# Patient Record
Sex: FEMALE | Employment: FULL TIME
[De-identification: names, ages, dates, MRNs, and addresses within clinical notes are randomized per-mention and may not be internally consistent; named-entity substitution may affect disease eponyms.]

---

## 2017-08-12 ENCOUNTER — HEALTH MAINTENANCE LETTER (OUTPATIENT)
Age: 22
End: 2017-08-12

## 2018-05-10 ENCOUNTER — AMBULATORY - HEALTHEAST (OUTPATIENT)
Dept: MULTI SPECIALTY CLINIC | Facility: CLINIC | Age: 23
End: 2018-05-10

## 2018-05-10 LAB — PAP SMEAR - HIM PATIENT REPORTED: NORMAL

## 2019-03-04 ENCOUNTER — OFFICE VISIT - HEALTHEAST (OUTPATIENT)
Dept: FAMILY MEDICINE | Facility: CLINIC | Age: 24
End: 2019-03-04

## 2019-03-04 ENCOUNTER — COMMUNICATION - HEALTHEAST (OUTPATIENT)
Dept: TELEHEALTH | Facility: CLINIC | Age: 24
End: 2019-03-04

## 2019-03-04 DIAGNOSIS — Z34.92 NORMAL PREGNANCY IN SECOND TRIMESTER: ICD-10-CM

## 2019-03-04 DIAGNOSIS — F33.1 MODERATE EPISODE OF RECURRENT MAJOR DEPRESSIVE DISORDER (H): ICD-10-CM

## 2019-03-04 DIAGNOSIS — F98.8 ATTENTION DEFICIT DISORDER, UNSPECIFIED HYPERACTIVITY PRESENCE: ICD-10-CM

## 2019-03-04 DIAGNOSIS — O23.02 PYELONEPHRITIS AFFECTING PREGNANCY IN SECOND TRIMESTER: ICD-10-CM

## 2019-03-04 DIAGNOSIS — O99.332 TOBACCO SMOKING AFFECTING PREGNANCY IN SECOND TRIMESTER: ICD-10-CM

## 2019-03-04 DIAGNOSIS — F15.11 METHAMPHETAMINE ABUSE IN REMISSION (H): ICD-10-CM

## 2019-03-04 ASSESSMENT — MIFFLIN-ST. JEOR: SCORE: 1675.75

## 2019-03-08 ENCOUNTER — RECORDS - HEALTHEAST (OUTPATIENT)
Dept: ADMINISTRATIVE | Facility: OTHER | Age: 24
End: 2019-03-08

## 2019-03-08 ENCOUNTER — PRENATAL OFFICE VISIT - HEALTHEAST (OUTPATIENT)
Dept: FAMILY MEDICINE | Facility: CLINIC | Age: 24
End: 2019-03-08

## 2019-03-08 DIAGNOSIS — F15.11 METHAMPHETAMINE ABUSE IN REMISSION (H): ICD-10-CM

## 2019-03-08 DIAGNOSIS — O23.02 PYELONEPHRITIS AFFECTING PREGNANCY IN SECOND TRIMESTER: ICD-10-CM

## 2019-03-08 DIAGNOSIS — O09.93 SUPERVISION OF HIGH RISK PREGNANCY IN THIRD TRIMESTER: ICD-10-CM

## 2019-03-08 DIAGNOSIS — F33.1 MODERATE EPISODE OF RECURRENT MAJOR DEPRESSIVE DISORDER (H): ICD-10-CM

## 2019-03-08 DIAGNOSIS — O99.333 TOBACCO SMOKING AFFECTING PREGNANCY IN THIRD TRIMESTER: ICD-10-CM

## 2019-03-08 DIAGNOSIS — Z76.89 ENCOUNTER TO ESTABLISH CARE WITH NEW DOCTOR: ICD-10-CM

## 2019-03-08 DIAGNOSIS — D69.6 THROMBOCYTOPENIA DURING PREGNANCY (H): ICD-10-CM

## 2019-03-08 DIAGNOSIS — O99.119 THROMBOCYTOPENIA DURING PREGNANCY (H): ICD-10-CM

## 2019-03-22 ENCOUNTER — PRENATAL OFFICE VISIT - HEALTHEAST (OUTPATIENT)
Dept: FAMILY MEDICINE | Facility: CLINIC | Age: 24
End: 2019-03-22

## 2019-03-22 DIAGNOSIS — O23.02 PYELONEPHRITIS AFFECTING PREGNANCY IN SECOND TRIMESTER: ICD-10-CM

## 2019-03-22 DIAGNOSIS — O99.119 THROMBOCYTOPENIA DURING PREGNANCY (H): ICD-10-CM

## 2019-03-22 DIAGNOSIS — F15.11 METHAMPHETAMINE ABUSE IN REMISSION (H): ICD-10-CM

## 2019-03-22 DIAGNOSIS — D69.6 THROMBOCYTOPENIA DURING PREGNANCY (H): ICD-10-CM

## 2019-03-22 DIAGNOSIS — F33.1 MODERATE EPISODE OF RECURRENT MAJOR DEPRESSIVE DISORDER (H): ICD-10-CM

## 2019-03-22 DIAGNOSIS — O09.93 SUPERVISION OF HIGH RISK PREGNANCY IN THIRD TRIMESTER: ICD-10-CM

## 2019-03-22 DIAGNOSIS — O99.333 TOBACCO SMOKING AFFECTING PREGNANCY IN THIRD TRIMESTER: ICD-10-CM

## 2019-03-22 LAB
ERYTHROCYTE [DISTWIDTH] IN BLOOD BY AUTOMATED COUNT: 12 % (ref 11–14.5)
FASTING STATUS PATIENT QL REPORTED: NO
GLUCOSE 1H P 50 G GLC PO SERPL-MCNC: 135 MG/DL (ref 70–139)
HCT VFR BLD AUTO: 40 % (ref 35–47)
HGB BLD-MCNC: 13 G/DL (ref 12–16)
MCH RBC QN AUTO: 31.4 PG (ref 27–34)
MCHC RBC AUTO-ENTMCNC: 32.6 G/DL (ref 32–36)
MCV RBC AUTO: 96 FL (ref 80–100)
PLATELET # BLD AUTO: 84 THOU/UL (ref 140–440)
PMV BLD AUTO: 10.4 FL (ref 7–10)
RBC # BLD AUTO: 4.16 MILL/UL (ref 3.8–5.4)
WBC: 13.4 THOU/UL (ref 4–11)

## 2019-03-23 LAB — T PALLIDUM AB SER QL: ABNORMAL

## 2019-03-25 ENCOUNTER — AMBULATORY - HEALTHEAST (OUTPATIENT)
Dept: FAMILY MEDICINE | Facility: CLINIC | Age: 24
End: 2019-03-25

## 2019-03-25 DIAGNOSIS — O23.02 PYELONEPHRITIS AFFECTING PREGNANCY IN SECOND TRIMESTER: ICD-10-CM

## 2019-03-25 DIAGNOSIS — O99.119 THROMBOCYTOPENIA DURING PREGNANCY (H): Primary | ICD-10-CM

## 2019-03-25 DIAGNOSIS — O09.93 SUPERVISION OF HIGH RISK PREGNANCY IN THIRD TRIMESTER: ICD-10-CM

## 2019-03-25 DIAGNOSIS — O99.333 TOBACCO SMOKING AFFECTING PREGNANCY IN THIRD TRIMESTER: ICD-10-CM

## 2019-03-25 DIAGNOSIS — D69.6 THROMBOCYTOPENIA DURING PREGNANCY (H): ICD-10-CM

## 2019-03-25 DIAGNOSIS — O99.119 THROMBOCYTOPENIA DURING PREGNANCY (H): ICD-10-CM

## 2019-03-25 DIAGNOSIS — F15.11 METHAMPHETAMINE ABUSE IN REMISSION (H): ICD-10-CM

## 2019-03-25 DIAGNOSIS — F33.1 MODERATE EPISODE OF RECURRENT MAJOR DEPRESSIVE DISORDER (H): ICD-10-CM

## 2019-03-25 DIAGNOSIS — D69.6 THROMBOCYTOPENIA DURING PREGNANCY (H): Primary | ICD-10-CM

## 2019-03-25 LAB — RPR (REFLEX ORDER ONLY): NORMAL

## 2019-03-27 LAB — T PALLIDUM AB SER QL AGGL: REACTIVE

## 2019-03-28 ENCOUNTER — COMMUNICATION - HEALTHEAST (OUTPATIENT)
Dept: FAMILY MEDICINE | Facility: CLINIC | Age: 24
End: 2019-03-28

## 2019-03-28 ENCOUNTER — AMBULATORY - HEALTHEAST (OUTPATIENT)
Dept: NURSING | Facility: CLINIC | Age: 24
End: 2019-03-28

## 2019-03-28 ENCOUNTER — AMBULATORY - HEALTHEAST (OUTPATIENT)
Dept: FAMILY MEDICINE | Facility: CLINIC | Age: 24
End: 2019-03-28

## 2019-03-28 DIAGNOSIS — O98.113 SYPHILIS AFFECTING PREGNANCY IN THIRD TRIMESTER: ICD-10-CM

## 2019-04-05 ENCOUNTER — AMBULATORY - HEALTHEAST (OUTPATIENT)
Dept: MATERNAL FETAL MEDICINE | Facility: HOSPITAL | Age: 24
End: 2019-04-05

## 2019-04-05 ENCOUNTER — COMMUNICATION - HEALTHEAST (OUTPATIENT)
Dept: SCHEDULING | Facility: CLINIC | Age: 24
End: 2019-04-05

## 2019-04-05 DIAGNOSIS — O26.90 PREGNANCY, ANTEPARTUM, COMPLICATIONS: ICD-10-CM

## 2019-04-08 ENCOUNTER — RECORDS - HEALTHEAST (OUTPATIENT)
Dept: ADMINISTRATIVE | Facility: OTHER | Age: 24
End: 2019-04-08

## 2019-04-08 ENCOUNTER — OFFICE VISIT - HEALTHEAST (OUTPATIENT)
Dept: MATERNAL FETAL MEDICINE | Facility: HOSPITAL | Age: 24
End: 2019-04-08

## 2019-04-08 ENCOUNTER — RECORDS - HEALTHEAST (OUTPATIENT)
Dept: ULTRASOUND IMAGING | Facility: HOSPITAL | Age: 24
End: 2019-04-08

## 2019-04-08 DIAGNOSIS — O26.90 PREGNANCY RELATED CONDITIONS, UNSPECIFIED, UNSPECIFIED TRIMESTER: ICD-10-CM

## 2019-04-08 DIAGNOSIS — O26.90 PREGNANCY, ANTEPARTUM, COMPLICATIONS: ICD-10-CM

## 2019-04-08 DIAGNOSIS — O99.119 THROMBOCYTOPENIA AFFECTING PREGNANCY (H): ICD-10-CM

## 2019-04-08 DIAGNOSIS — D69.6 THROMBOCYTOPENIA AFFECTING PREGNANCY (H): ICD-10-CM

## 2019-04-08 LAB
ALBUMIN SERPL-MCNC: 3.2 G/DL (ref 3.5–5)
ALP SERPL-CCNC: 143 U/L (ref 45–120)
ALT SERPL W P-5'-P-CCNC: 17 U/L (ref 0–45)
ANION GAP SERPL CALCULATED.3IONS-SCNC: 7 MMOL/L (ref 5–18)
APTT PPP: 28 SECONDS (ref 24–37)
AST SERPL W P-5'-P-CCNC: 20 U/L (ref 0–40)
BILIRUB SERPL-MCNC: 0.3 MG/DL (ref 0–1)
BUN SERPL-MCNC: 5 MG/DL (ref 8–22)
CALCIUM SERPL-MCNC: 9.2 MG/DL (ref 8.5–10.5)
CHLORIDE BLD-SCNC: 106 MMOL/L (ref 98–107)
CO2 SERPL-SCNC: 22 MMOL/L (ref 22–31)
CREAT SERPL-MCNC: 0.61 MG/DL (ref 0.6–1.1)
ERYTHROCYTE [DISTWIDTH] IN BLOOD BY AUTOMATED COUNT: 13.2 % (ref 11–14.5)
FIBRINOGEN PPP-MCNC: 397 MG/DL (ref 170–410)
GFR SERPL CREATININE-BSD FRML MDRD: >60 ML/MIN/1.73M2
GLUCOSE BLD-MCNC: 91 MG/DL (ref 70–125)
HCT VFR BLD AUTO: 38.7 % (ref 35–47)
HGB BLD-MCNC: 13.3 G/DL (ref 12–16)
INR PPP: 1 (ref 0.9–1.1)
MCH RBC QN AUTO: 32.1 PG (ref 27–34)
MCHC RBC AUTO-ENTMCNC: 34.4 G/DL (ref 32–36)
MCV RBC AUTO: 94 FL (ref 80–100)
PLATELET # BLD AUTO: 75 THOU/UL (ref 140–440)
PMV BLD AUTO: 14.3 FL (ref 8.5–12.5)
POTASSIUM BLD-SCNC: 4.2 MMOL/L (ref 3.5–5)
PROT SERPL-MCNC: 6.6 G/DL (ref 6–8)
RBC # BLD AUTO: 4.14 MILL/UL (ref 3.8–5.4)
SODIUM SERPL-SCNC: 135 MMOL/L (ref 136–145)
WBC: 13.9 THOU/UL (ref 4–11)

## 2019-04-16 ENCOUNTER — RECORDS - HEALTHEAST (OUTPATIENT)
Dept: ADMINISTRATIVE | Facility: OTHER | Age: 24
End: 2019-04-16

## 2019-04-25 ENCOUNTER — OFFICE VISIT - HEALTHEAST (OUTPATIENT)
Dept: FAMILY MEDICINE | Facility: CLINIC | Age: 24
End: 2019-04-25

## 2019-04-25 ENCOUNTER — COMMUNICATION - HEALTHEAST (OUTPATIENT)
Dept: NURSING | Facility: CLINIC | Age: 24
End: 2019-04-25

## 2019-04-25 DIAGNOSIS — D69.6 THROMBOCYTOPENIA DURING PREGNANCY (H): ICD-10-CM

## 2019-04-25 DIAGNOSIS — O99.333 TOBACCO SMOKING AFFECTING PREGNANCY IN THIRD TRIMESTER: ICD-10-CM

## 2019-04-25 DIAGNOSIS — F33.1 MODERATE EPISODE OF RECURRENT MAJOR DEPRESSIVE DISORDER (H): ICD-10-CM

## 2019-04-25 DIAGNOSIS — F15.11 METHAMPHETAMINE ABUSE IN REMISSION (H): ICD-10-CM

## 2019-04-25 DIAGNOSIS — O99.119 THROMBOCYTOPENIA DURING PREGNANCY (H): ICD-10-CM

## 2019-04-25 DIAGNOSIS — O98.113 SYPHILIS AFFECTING PREGNANCY IN THIRD TRIMESTER: ICD-10-CM

## 2019-04-25 DIAGNOSIS — O23.02 PYELONEPHRITIS AFFECTING PREGNANCY IN SECOND TRIMESTER: ICD-10-CM

## 2019-04-25 DIAGNOSIS — O09.93 SUPERVISION OF HIGH RISK PREGNANCY IN THIRD TRIMESTER: ICD-10-CM

## 2019-04-25 LAB
ERYTHROCYTE [DISTWIDTH] IN BLOOD BY AUTOMATED COUNT: 12 % (ref 11–14.5)
HCT VFR BLD AUTO: 40.3 % (ref 35–47)
HGB BLD-MCNC: 13.3 G/DL (ref 12–16)
MCH RBC QN AUTO: 32 PG (ref 27–34)
MCHC RBC AUTO-ENTMCNC: 33 G/DL (ref 32–36)
MCV RBC AUTO: 97 FL (ref 80–100)
PLATELET # BLD AUTO: 82 THOU/UL (ref 140–440)
PMV BLD AUTO: 10.8 FL (ref 7–10)
RBC # BLD AUTO: 4.16 MILL/UL (ref 3.8–5.4)
WBC: 18.2 THOU/UL (ref 4–11)

## 2019-04-30 ENCOUNTER — AMBULATORY - HEALTHEAST (OUTPATIENT)
Dept: NURSING | Facility: CLINIC | Age: 24
End: 2019-04-30

## 2019-04-30 ENCOUNTER — AMBULATORY - HEALTHEAST (OUTPATIENT)
Dept: LAB | Facility: CLINIC | Age: 24
End: 2019-04-30

## 2019-04-30 DIAGNOSIS — O99.119 THROMBOCYTOPENIA DURING PREGNANCY (H): ICD-10-CM

## 2019-04-30 DIAGNOSIS — D69.6 THROMBOCYTOPENIA DURING PREGNANCY (H): ICD-10-CM

## 2019-04-30 LAB
ERYTHROCYTE [DISTWIDTH] IN BLOOD BY AUTOMATED COUNT: 13.1 % (ref 11–14.5)
HCT VFR BLD AUTO: 39.1 % (ref 35–47)
HGB BLD-MCNC: 12.8 G/DL (ref 12–16)
MCH RBC QN AUTO: 31.7 PG (ref 27–34)
MCHC RBC AUTO-ENTMCNC: 32.7 G/DL (ref 32–36)
MCV RBC AUTO: 97 FL (ref 80–100)
PLATELET # BLD AUTO: 84 THOU/UL (ref 140–440)
RBC # BLD AUTO: 4.04 MILL/UL (ref 3.8–5.4)
WBC: 16.5 THOU/UL (ref 4–11)

## 2019-05-02 ENCOUNTER — AMBULATORY - HEALTHEAST (OUTPATIENT)
Dept: NURSING | Facility: CLINIC | Age: 24
End: 2019-05-02

## 2019-05-03 ENCOUNTER — COMMUNICATION - HEALTHEAST (OUTPATIENT)
Dept: NURSING | Facility: CLINIC | Age: 24
End: 2019-05-03

## 2019-05-09 ENCOUNTER — PRENATAL OFFICE VISIT - HEALTHEAST (OUTPATIENT)
Dept: FAMILY MEDICINE | Facility: CLINIC | Age: 24
End: 2019-05-09

## 2019-05-09 ENCOUNTER — AMBULATORY - HEALTHEAST (OUTPATIENT)
Dept: NURSING | Facility: CLINIC | Age: 24
End: 2019-05-09

## 2019-05-09 DIAGNOSIS — O98.113 SYPHILIS AFFECTING PREGNANCY IN THIRD TRIMESTER: ICD-10-CM

## 2019-05-09 DIAGNOSIS — F15.11 METHAMPHETAMINE ABUSE IN REMISSION (H): ICD-10-CM

## 2019-05-09 DIAGNOSIS — F33.1 MODERATE EPISODE OF RECURRENT MAJOR DEPRESSIVE DISORDER (H): ICD-10-CM

## 2019-05-09 DIAGNOSIS — O23.02 PYELONEPHRITIS AFFECTING PREGNANCY IN SECOND TRIMESTER: ICD-10-CM

## 2019-05-09 DIAGNOSIS — N89.8 VAGINAL DISCHARGE: ICD-10-CM

## 2019-05-09 DIAGNOSIS — D69.6 THROMBOCYTOPENIA DURING PREGNANCY (H): ICD-10-CM

## 2019-05-09 DIAGNOSIS — O09.93 SUPERVISION OF HIGH RISK PREGNANCY IN THIRD TRIMESTER: ICD-10-CM

## 2019-05-09 DIAGNOSIS — O99.333 TOBACCO SMOKING AFFECTING PREGNANCY IN THIRD TRIMESTER: ICD-10-CM

## 2019-05-09 DIAGNOSIS — O99.119 THROMBOCYTOPENIA DURING PREGNANCY (H): ICD-10-CM

## 2019-05-09 LAB
CLUE CELLS: NORMAL
ERYTHROCYTE [DISTWIDTH] IN BLOOD BY AUTOMATED COUNT: 12.1 % (ref 11–14.5)
HCT VFR BLD AUTO: 39.2 % (ref 35–47)
HGB BLD-MCNC: 12.9 G/DL (ref 12–16)
MCH RBC QN AUTO: 32.2 PG (ref 27–34)
MCHC RBC AUTO-ENTMCNC: 32.9 G/DL (ref 32–36)
MCV RBC AUTO: 98 FL (ref 80–100)
PLATELET # BLD AUTO: 59 THOU/UL (ref 140–440)
PMV BLD AUTO: 10.8 FL (ref 7–10)
RBC # BLD AUTO: 4 MILL/UL (ref 3.8–5.4)
TRICHOMONAS, WET PREP: NORMAL
WBC: 13.6 THOU/UL (ref 4–11)
YEAST, WET PREP: NORMAL

## 2019-05-10 ENCOUNTER — COMMUNICATION - HEALTHEAST (OUTPATIENT)
Dept: NURSING | Facility: CLINIC | Age: 24
End: 2019-05-10

## 2019-05-10 LAB
ALLERGIC TO PENICILLIN: NO
GP B STREP DNA SPEC QL NAA+PROBE: NEGATIVE

## 2019-05-17 ENCOUNTER — PRENATAL OFFICE VISIT - HEALTHEAST (OUTPATIENT)
Dept: FAMILY MEDICINE | Facility: CLINIC | Age: 24
End: 2019-05-17

## 2019-05-17 ENCOUNTER — COMMUNICATION - HEALTHEAST (OUTPATIENT)
Dept: NURSING | Facility: CLINIC | Age: 24
End: 2019-05-17

## 2019-05-17 DIAGNOSIS — O23.02 PYELONEPHRITIS AFFECTING PREGNANCY IN SECOND TRIMESTER: ICD-10-CM

## 2019-05-17 DIAGNOSIS — O09.93 SUPERVISION OF HIGH RISK PREGNANCY IN THIRD TRIMESTER: ICD-10-CM

## 2019-05-17 DIAGNOSIS — F15.11 METHAMPHETAMINE ABUSE IN REMISSION (H): ICD-10-CM

## 2019-05-17 DIAGNOSIS — K21.9 GASTROESOPHAGEAL REFLUX IN PREGNANCY IN THIRD TRIMESTER: ICD-10-CM

## 2019-05-17 DIAGNOSIS — D69.6 THROMBOCYTOPENIA DURING PREGNANCY (H): ICD-10-CM

## 2019-05-17 DIAGNOSIS — O99.119 THROMBOCYTOPENIA DURING PREGNANCY (H): ICD-10-CM

## 2019-05-17 DIAGNOSIS — F33.1 MODERATE EPISODE OF RECURRENT MAJOR DEPRESSIVE DISORDER (H): ICD-10-CM

## 2019-05-17 DIAGNOSIS — O99.613 GASTROESOPHAGEAL REFLUX IN PREGNANCY IN THIRD TRIMESTER: ICD-10-CM

## 2019-05-17 DIAGNOSIS — O99.333 TOBACCO SMOKING AFFECTING PREGNANCY IN THIRD TRIMESTER: ICD-10-CM

## 2019-05-17 DIAGNOSIS — O98.113 SYPHILIS AFFECTING PREGNANCY IN THIRD TRIMESTER: ICD-10-CM

## 2019-05-17 LAB
ERYTHROCYTE [DISTWIDTH] IN BLOOD BY AUTOMATED COUNT: 12.5 % (ref 11–14.5)
HCT VFR BLD AUTO: 38.7 % (ref 35–47)
HGB BLD-MCNC: 12.7 G/DL (ref 12–16)
MCH RBC QN AUTO: 32.2 PG (ref 27–34)
MCHC RBC AUTO-ENTMCNC: 32.8 G/DL (ref 32–36)
MCV RBC AUTO: 98 FL (ref 80–100)
PLATELET # BLD AUTO: 72 THOU/UL (ref 140–440)
PMV BLD AUTO: 10.8 FL (ref 7–10)
RBC # BLD AUTO: 3.94 MILL/UL (ref 3.8–5.4)
WBC: 13.6 THOU/UL (ref 4–11)

## 2019-05-21 ENCOUNTER — COMMUNICATION - HEALTHEAST (OUTPATIENT)
Dept: NURSING | Facility: CLINIC | Age: 24
End: 2019-05-21

## 2019-05-22 ENCOUNTER — PRENATAL OFFICE VISIT - HEALTHEAST (OUTPATIENT)
Dept: FAMILY MEDICINE | Facility: CLINIC | Age: 24
End: 2019-05-22

## 2019-05-22 ENCOUNTER — COMMUNICATION - HEALTHEAST (OUTPATIENT)
Dept: CARE COORDINATION | Facility: CLINIC | Age: 24
End: 2019-05-22

## 2019-05-22 DIAGNOSIS — O23.02 PYELONEPHRITIS AFFECTING PREGNANCY IN SECOND TRIMESTER: ICD-10-CM

## 2019-05-22 DIAGNOSIS — D69.6 THROMBOCYTOPENIA DURING PREGNANCY (H): ICD-10-CM

## 2019-05-22 DIAGNOSIS — O09.93 SUPERVISION OF HIGH RISK PREGNANCY IN THIRD TRIMESTER: ICD-10-CM

## 2019-05-22 DIAGNOSIS — O99.119 THROMBOCYTOPENIA DURING PREGNANCY (H): ICD-10-CM

## 2019-05-22 DIAGNOSIS — O99.333 TOBACCO SMOKING AFFECTING PREGNANCY IN THIRD TRIMESTER: ICD-10-CM

## 2019-05-22 DIAGNOSIS — F33.1 MODERATE EPISODE OF RECURRENT MAJOR DEPRESSIVE DISORDER (H): ICD-10-CM

## 2019-05-22 DIAGNOSIS — F15.11 METHAMPHETAMINE ABUSE IN REMISSION (H): ICD-10-CM

## 2019-05-22 DIAGNOSIS — O98.113 SYPHILIS AFFECTING PREGNANCY IN THIRD TRIMESTER: ICD-10-CM

## 2019-05-22 LAB
ERYTHROCYTE [DISTWIDTH] IN BLOOD BY AUTOMATED COUNT: 13.3 % (ref 11–14.5)
HCT VFR BLD AUTO: 39.7 % (ref 35–47)
HGB BLD-MCNC: 13.2 G/DL (ref 12–16)
MCH RBC QN AUTO: 32.3 PG (ref 27–34)
MCHC RBC AUTO-ENTMCNC: 33.2 G/DL (ref 32–36)
MCV RBC AUTO: 97 FL (ref 80–100)
PLATELET # BLD AUTO: 74 THOU/UL (ref 140–440)
RBC # BLD AUTO: 4.09 MILL/UL (ref 3.8–5.4)
WBC: 14.4 THOU/UL (ref 4–11)

## 2019-05-23 ENCOUNTER — AMBULATORY - HEALTHEAST (OUTPATIENT)
Dept: FAMILY MEDICINE | Facility: CLINIC | Age: 24
End: 2019-05-23

## 2019-05-23 DIAGNOSIS — D69.6 THROMBOCYTOPENIA DURING PREGNANCY (H): ICD-10-CM

## 2019-05-23 DIAGNOSIS — O99.119 THROMBOCYTOPENIA DURING PREGNANCY (H): ICD-10-CM

## 2019-05-30 ENCOUNTER — PRENATAL OFFICE VISIT - HEALTHEAST (OUTPATIENT)
Dept: FAMILY MEDICINE | Facility: CLINIC | Age: 24
End: 2019-05-30

## 2019-05-30 DIAGNOSIS — O99.119 THROMBOCYTOPENIA DURING PREGNANCY (H): ICD-10-CM

## 2019-05-30 DIAGNOSIS — D69.6 THROMBOCYTOPENIA DURING PREGNANCY (H): ICD-10-CM

## 2019-05-30 DIAGNOSIS — F15.11 METHAMPHETAMINE ABUSE IN REMISSION (H): ICD-10-CM

## 2019-05-30 DIAGNOSIS — F33.1 MODERATE EPISODE OF RECURRENT MAJOR DEPRESSIVE DISORDER (H): ICD-10-CM

## 2019-05-30 DIAGNOSIS — O23.02 PYELONEPHRITIS AFFECTING PREGNANCY IN SECOND TRIMESTER: ICD-10-CM

## 2019-05-30 DIAGNOSIS — O09.93 SUPERVISION OF HIGH RISK PREGNANCY IN THIRD TRIMESTER: ICD-10-CM

## 2019-05-30 DIAGNOSIS — O99.333 TOBACCO SMOKING AFFECTING PREGNANCY IN THIRD TRIMESTER: ICD-10-CM

## 2019-05-30 DIAGNOSIS — O98.113 SYPHILIS AFFECTING PREGNANCY IN THIRD TRIMESTER: ICD-10-CM

## 2019-05-30 LAB
ERYTHROCYTE [DISTWIDTH] IN BLOOD BY AUTOMATED COUNT: 13.8 % (ref 11–14.5)
HCT VFR BLD AUTO: 39.1 % (ref 35–47)
HGB BLD-MCNC: 13.4 G/DL (ref 12–16)
MCH RBC QN AUTO: 32.2 PG (ref 27–34)
MCHC RBC AUTO-ENTMCNC: 34.3 G/DL (ref 32–36)
MCV RBC AUTO: 94 FL (ref 80–100)
PLATELET # BLD AUTO: 134 THOU/UL (ref 140–440)
PMV BLD AUTO: 14.2 FL (ref 8.5–12.5)
RBC # BLD AUTO: 4.16 MILL/UL (ref 3.8–5.4)
WBC: 22.1 THOU/UL (ref 4–11)

## 2019-05-31 ENCOUNTER — COMMUNICATION - HEALTHEAST (OUTPATIENT)
Dept: FAMILY MEDICINE | Facility: CLINIC | Age: 24
End: 2019-05-31

## 2019-06-05 ENCOUNTER — COMMUNICATION - HEALTHEAST (OUTPATIENT)
Dept: NURSING | Facility: CLINIC | Age: 24
End: 2019-06-05

## 2019-06-05 ENCOUNTER — COMMUNICATION - HEALTHEAST (OUTPATIENT)
Dept: FAMILY MEDICINE | Facility: CLINIC | Age: 24
End: 2019-06-05

## 2019-06-07 ENCOUNTER — COMMUNICATION - HEALTHEAST (OUTPATIENT)
Dept: CARE COORDINATION | Facility: CLINIC | Age: 24
End: 2019-06-07

## 2019-06-12 ENCOUNTER — OFFICE VISIT - HEALTHEAST (OUTPATIENT)
Dept: FAMILY MEDICINE | Facility: CLINIC | Age: 24
End: 2019-06-12

## 2019-06-12 DIAGNOSIS — F33.1 MODERATE EPISODE OF RECURRENT MAJOR DEPRESSIVE DISORDER (H): ICD-10-CM

## 2019-06-12 DIAGNOSIS — F98.8 ATTENTION DEFICIT DISORDER, UNSPECIFIED HYPERACTIVITY PRESENCE: ICD-10-CM

## 2019-06-12 DIAGNOSIS — O99.119 THROMBOCYTOPENIA DURING PREGNANCY (H): ICD-10-CM

## 2019-06-12 DIAGNOSIS — D69.6 THROMBOCYTOPENIA DURING PREGNANCY (H): ICD-10-CM

## 2019-06-12 ASSESSMENT — MIFFLIN-ST. JEOR: SCORE: 1712.04

## 2019-06-14 ENCOUNTER — COMMUNICATION - HEALTHEAST (OUTPATIENT)
Dept: NURSING | Facility: CLINIC | Age: 24
End: 2019-06-14

## 2019-06-19 ENCOUNTER — COMMUNICATION - HEALTHEAST (OUTPATIENT)
Dept: NURSING | Facility: CLINIC | Age: 24
End: 2019-06-19

## 2019-06-19 ENCOUNTER — COMMUNICATION - HEALTHEAST (OUTPATIENT)
Dept: CARE COORDINATION | Facility: CLINIC | Age: 24
End: 2019-06-19

## 2019-07-01 ENCOUNTER — COMMUNICATION - HEALTHEAST (OUTPATIENT)
Dept: CARE COORDINATION | Facility: CLINIC | Age: 24
End: 2019-07-01

## 2019-07-12 ENCOUNTER — COMMUNICATION - HEALTHEAST (OUTPATIENT)
Dept: NURSING | Facility: CLINIC | Age: 24
End: 2019-07-12

## 2019-07-15 ENCOUNTER — COMMUNICATION - HEALTHEAST (OUTPATIENT)
Dept: NURSING | Facility: CLINIC | Age: 24
End: 2019-07-15

## 2019-07-30 ENCOUNTER — COMMUNICATION - HEALTHEAST (OUTPATIENT)
Dept: NURSING | Facility: CLINIC | Age: 24
End: 2019-07-30

## 2019-08-29 ENCOUNTER — COMMUNICATION - HEALTHEAST (OUTPATIENT)
Dept: NURSING | Facility: CLINIC | Age: 24
End: 2019-08-29

## 2019-09-06 ENCOUNTER — COMMUNICATION - HEALTHEAST (OUTPATIENT)
Dept: NURSING | Facility: CLINIC | Age: 24
End: 2019-09-06

## 2019-09-17 ENCOUNTER — COMMUNICATION - HEALTHEAST (OUTPATIENT)
Dept: NURSING | Facility: CLINIC | Age: 24
End: 2019-09-17

## 2019-11-06 ENCOUNTER — HEALTH MAINTENANCE LETTER (OUTPATIENT)
Age: 24
End: 2019-11-06

## 2020-11-29 ENCOUNTER — HEALTH MAINTENANCE LETTER (OUTPATIENT)
Age: 25
End: 2020-11-29

## 2021-05-25 ENCOUNTER — RECORDS - HEALTHEAST (OUTPATIENT)
Dept: ADMINISTRATIVE | Facility: CLINIC | Age: 26
End: 2021-05-25

## 2021-05-26 NOTE — PATIENT INSTRUCTIONS - HE
Phone Numbers:  [facility]: [phone]     When to call or come in to the hospital  If you notice a decrease in your baby's movement.   If your begin to experience contractions that are 5 minutes or less apart and lasting for over 45 seconds, or if contractions are becoming more painful.  If you have any bleeding or leakage of fluids.   If you have a headache not resolved with tylenol, right upper abdominal pain, or sudden onset of swelling.  You know your body best. Never hesitate to call or go to the hospital if something doesn't feel right!    Blood Glucose Screening During Pregnancy   Gestational diabetes is diabetes that only pregnant women get. Changes in your body during pregnancy can cause high blood sugar (glucose). This can cause problems for you and your baby. It is a serious condition, but it can be controlled.  What to Know If You Test Positive   Gestational diabetes is treatable. The best way to control gestational diabetes is to find out you have it as early as possible and start treatment quickly.   Gestational diabetes can cause problems for the mother during pregnancy. It can also cause problems with the baby during pregnancy, delivery, and after. Treatment greatly lowers the chance for problems.   The changes in your body that cause gestational diabetes normally occur only when you are pregnant. After the baby is born, your body goes back to normal and the condition goes away. You may be more likely to have type 2 diabetes later, though. So talk to your doctor about ways to help prevent type 2 diabetes.  Treating Gestational Diabetes   You ll need to check your blood sugar regularly. You can do this at home by pricking your finger and checking a drop of blood on a glucose monitor. Your health care provider will show you how and when to check your blood sugar and discuss your target blood sugar level.   To manage your blood sugar, you will be given a special plan. It will likely involve  planning your meals and getting regular exercise. Some women need to take a hormone called insulin, or an oral hypoglycemic medication to help control their blood sugar.    Making Plans for Feeding Your Baby  By this point, you probably have read a lot about feeding your baby. Breastfeed or formula? Each mother s decision is her own and we respect you and your choices. We ve gathered information on both breastfeeding and formula feeding to help with your decision. Talking with your physician can help in your decision.     Skin-to-skin contact  Being close to mom helps your baby adjust to life outside of the womb. It helps your baby regulate their body temperature, heart rate, and breathing. Your baby will usually be placed skin-to-skin immediately following birth or as soon as possible, if medical intervention is needed.    Rooming-In  Having your baby stay with you in your room is called  rooming-in.  Keeping your baby in your room helps you to learn how to care for your baby by getting to know your baby s cues, body rhythms and sleep cycle.       Cue-based feeding  Cues (signals) are baby s way of telling you what he or she wants. When you learn your infant s cues, you know how to care for and feed your baby. Feeding cues are the licking and smacking of lips, bringing their fist to their mouth, and a reflex called  rooting - where baby turns and opens his or her mouth, searching for the breast or bottle. Crying is a late feeding cue. Babies can feed frequently, often at least 8 times in 24 hours.  Breastfeeding facts  Breast milk is the best source of nutrition for your baby and is available at birth. In the first couple of days, your milk volume is already starting to increase, though it may not be noticeable. Breastfeeding frequently to increase your milk supply. Within three to five days, you will begin to notice larger milk volumes. An increase in breast size, heaviness and firmness are often described as the  milk  coming in.  Frequent breastfeeding can help breasts from getting overly firm and painful. You will know the baby is getting enough milk if your baby is having wet and dirty diapers and gaining weight.   Positioning and attachment   Get comfortable. Use pillows as needed to support your arms and baby. Hold baby close at the level of your breast, facing you in a tummy to tummy position. Skin to skin helps with this. Position the baby with his or her nose by the nipple. There should be a straight line from baby s ear to shoulder to hips.  Tickle your baby s lips or wait for baby to open mouth wide, bring baby to breast by leading with the chin. Aim the nipple at the roof of baby s mouth. A rapid sucking pattern is followed by longer, drawing pattern with occasional swallows heard. When baby is correctly latched, your nipple and much of the areola are pulled well into baby s mouth.      Returning to work or school  Focus on a good start to breastfeeding. Many women continue to provide breastmilk for their baby when they return to work or school. Making plans about where to pump and store milk can make the transition go well. Talk with other mothers who have also returned to work or school for tips and support. Your employer s Human Resource department may be a resource as well.        babies can mean fewer  sick  days for you.    A quality breast pump will also save time and add comfort. Check with your insurance prior to giving birth for breast pump coverage. Many insurance companies include a pump within your benefits.    Breastfeed when you are with your baby. Reserve your bottles of breast milk for when you are away.     Your breasts will need to be  emptied  either by your baby or a pump. Plan to pump at least twice in an eight hour day.    If you cannot pump at work, continue breastfeeding at home. Any amount of breast milk is worth giving to your baby.    Formula feeding facts  If you are planning  to use formula to feed your baby, you will want to make some preparations ahead of time. Talk to your doctor about what type of formula to use. Some are iron-fortified, meaning they have extra iron in them. You will want to purchase formula and bottles before your baby is born to be sure you are ready after you return from the hospital. The hospitals do not provide formula samples to take home.    Be sure to follow formula mixing directions closely. Regular milk in the dairy case at the grocery store should not be given to babies under 1 year old. Baby formula is sold in several forms including:    Ready-to-use. This is the most expensive, but no mixing is necessary.    Concentrated liquid. This is less expensive than ready-to-use and you mix with water.    Powder. This is the least expensive. You mix one level scoop of powdered formula with two ounces of water and stir well.    How do I warm my baby s bottles?  You may feed your baby a bottle without warming it first. It is OK for the breast milk or formula to be cool or room temperature. If your baby seems to prefer it warmed, you can put the filled bottle in a container of warm water and let it stand for a few minutes. Check the temperature of the liquid on your skin before feeding it to your baby; to be sure it isn t too hot. Do not heat bottles in the microwave. Microwaves heat food and liquids unevenly, and this can cause hot spots that can burn your baby.    How do I clean and sterilize bottles?  Sterilize bottles and nipples before you use them for the first time. You can do this by putting them in boiling water for 5 minutes. After that first time, you can wash them in hot and soapy water. Rinse them carefully to be sure there is no soap left on them. You can also wash them in the .

## 2021-05-26 NOTE — PROGRESS NOTES
Clinic Note - Return OB Visit    Mariela Chester is a 23 y.o.  female who presents to clinic for a follow up OB visit. She is currently 28w2d with an estimated date of delivery of 19 via LMP c/w 13 wk US. She denies headache, chest pain, shortness of breath, abdominal pain/contractions, vaginal bleeding, or abnormal discharge. She has felt baby move.     New concerns today:   -no concerns today  -outpatient treatment still going fine, no relapse  -tobacco 1ppd  -lexapro - overall going well but interested in increasing dose for better management; GAD7 score 8 today, PHQ9 score 16 today    OB History    Para Term  AB Living   2 1 1     1   SAB TAB Ectopic Multiple Live Births           1      # Outcome Date GA Lbr Rudy/2nd Weight Sex Delivery Anes PTL Lv   2 Current            1 Term 2015    M Vag-Spont EPI N JUAN          Physical exam:  /60   Wt 204 lb 3 oz (92.6 kg)   LMP 2018   BMI 31.98 kg/m      General: alert, female in no acute distress  Abdomen: gravid uterus appropriate for gestation age at 28 cm above pubic symphysis, non-tender, FHTs 145  Extremities: no edema    Assessment/Plan:  1. Supervision of high risk pregnancy in third trimester   at 28+2 weeks today. Multiple high risk issues affecting this pregnancy including intermittent meth use (during most of first trimester and then again in 2nd trimester), tobacco use, pyelonephritis during pregnancy. Labs as below. Tdap today. Doesn't need Rhogam.  - Glucose,Gestational Challenge (1 Hour)  - RPR  - HM2(CBC w/o Differential)  - Tdap vaccine,  6yo or older,  IM    2. Methamphetamine abuse in remission (H)  Pt is currently participating in intensive outpatient treatment for her meth use - no relapses. Pt states through the program she is having drug testing done twice per week. CPS has already been contacted this pregnancy, will contact them again upon delivery or sooner if new concerns arise. Will plan for  UDS at hospital and possibly intermittently in clinic. Will plan to deliver at [facility] given capabilities for  care and monitoring, mom agrees with this plan.    3. Pyelonephritis affecting pregnancy in second trimester  Pt with a hx of pyelonephritis in Dec 2018, which was treated and pt declines further symptoms. Pt was not started on prophylactic antibiotic therapy at that time and I see no reason to do so now; however, if pt develops one more UTI during this pregnancy I would plan for ppx antibiotics until delivery.    4. Tobacco smoking affecting pregnancy in third trimester  Pt is currently smoking 1 ppd, she states she is unable to quit right now. Again reviewed potential risks and recommendation to quit.    5. Moderate episode of recurrent major depressive disorder (H)  Pt is currently taking lexapro 10mg daily - feels that this is working but symptoms not fully controlled, wondering about increasing dose. PHQ9 and GAD7 scores have been improving since starting medication. Agree with increasing dose to 20mg daily, prescription sent to pharmacy.  - PHQ9 Depression Screen  - escitalopram oxalate (LEXAPRO) 20 MG tablet; Take 1 tablet (20 mg total) by mouth daily.  Dispense: 60 tablet; Refill: 1    6. Thrombocytopenia during pregnancy (H)  Denies issues with easy bruising or bleeding. Per review of labs in [facility] Everywhere here are prior platelet levels:  18 102  12/10/18 67  18 103  18 100  18 83  Recheck today  - HM2(CBC w/o Differential)      Information given on gestational diabetes. 1 hour glucose tolerance test today, in addition to CBC and syphilis.   TDAP immunization given.  Blood type A pos. Rhogam not indicated  Discussed signs and symptoms of  labor.     Follow up in 3 weeks for routine prenatal visit.     Amanda Chery MD

## 2021-05-27 NOTE — TELEPHONE ENCOUNTER
Reason contacted:  Symptom   Information relayed:  Below message, provided patient with the telephone number for maternal fetal medicine as she does not have an appointment scheduled yet.   Additional questions:  No  Further follow-up needed:  No  Okay to leave a detailed message:  No

## 2021-05-27 NOTE — TELEPHONE ENCOUNTER
Pt is scheduled to see me next week and apparently hematology on 4/16. I had put in a referral for MFM for her low platelets but hematology can mange this also.    If pt is significantly concerned she should go to walk in clinic otherwise I am not too terribly worried about a bruise on her breast if she is otherwise feeling well.    Dr Chery

## 2021-05-27 NOTE — TELEPHONE ENCOUNTER
Left message to call back for: test results within this encounter. Please Advice pt to schedule a nurse only apt for penicillin unless  she has more question she can be put on Dr Chery schedule. Per Dr Matthews advice.  Magi Almaraz

## 2021-05-27 NOTE — TELEPHONE ENCOUNTER
----- Message from Amanda Chery MD sent at 3/28/2019  9:10 AM CDT -----  Please call pt to let her know her syphilis was positive and she needs to come to clinic to get treatment.    Hi Mariela,     Your syphilis test did end up coming back positive - I need you to come to clinic as soon as possible to get a shot of penicillin to treat this infection.    Thanks,  Dr Chery

## 2021-05-27 NOTE — PROGRESS NOTES
"Please see \"Imaging\" tab under \"Chart Review\" for details of today's visit and MFM Consult.    Gatito Vasquez        "

## 2021-05-27 NOTE — TELEPHONE ENCOUNTER
"Caller reports that she has developed a bruise on her R breast       Approx 6\"x4\"     Started yesterday    Painful to the touch     No swelling   No drainage   No fever   No injury       Call lost when transferring from from scheduling - attempted to call pt back -  Fred unable to dial that tele# per message     Msg to provider to alert them of this         Porfirio Sotelo, RN   Triage and Medication Refills      Reason for Disposition    Patient wants to be seen    Protocols used: BREAST SYMPTOMS-A-OH      "

## 2021-05-28 NOTE — PROGRESS NOTES
Scheduled F/U Call:  Attempt 1    Attempt 1: Care Guide called patient.  If this patient is returning my call please transfer to Nena Regan at ext [phone].  ** Next [facility] Outreach date: 19  RN Recommendations and Referrals  Financial resource guide consult/follow-up: Please send a referral if not already sent.  [facility] : Phone appointment on 2019 @ 2:00     Action Plan     RN Will  Will not add the patient to [facility] tracking list     Care Guide Will  Will follow Standard Work  Financial resource guide consult/follow-up: Please send a referral if not already sent.  [facility] : Phone appointment on 2019 @ 2:00      Emergency Plan  Depression  Everyone feels down at times. The blues are a natural part of life. But an unhappy period that s intense or lasts for more than a couple of weeks can be a sign of depression. Depression is a serious illness. It can disrupt the lives of family and friends. If you know someone you think may be depressed, find out what you can do to help.     Know the serious signals  Warning signals for suicide include:    Threats or talk of suicide    Statements such as  I won t be a problem much longer  or  Nothing matters     Giving away possessions or making a will or  arrangements    Buying a gun or other weapon    Sudden, unexplained cheerfulness or calm after a period of depression  If you notice any of these signs, get help right away. Call a health care professional, mental health clinic, or suicide hotline and ask what action to take. In an emergency, don t hesitate to call the police.     [facility] Mental Health Crisis Lines:  [facility] [phone]  [facility] [phone]  [facility] [phone]  [facility] [phone]  [facility] Adults [phone]  [facility], Children [phone]  [facility], Adults [phone]  [facility], Children [phone]     [id number]  Understanding the Disease of Addiction  What is  addiction?  Addiction is a long-lasting (chronic) disease of the brain. It affects how your brain learns and works.  Your genes and your environment can affect your risk for addiction. A family history of addiction also raises your risk. But anyone can have an addiction.  Unfortunately, many people falsely think that addiction is a moral weakness. They think that people addicted to drugs or alcohol are just behaving badly or making poor choices.     How does addiction affect my brain?  Whether you start using drugs or alcohol is your choice. But once your brain is exposed to the addictive substance, your brain begins to change. This is especially true if you are more at risk for addiction. These brain changes overpower your self-control. This happens because the substance overexcites the brain s reward center. The substance mimics the brain's own natural feel-good chemicals. The brain is rewired into believing that the substance is a good thing and that you need it to survive. This rewiring is very strong. Over time, you no longer find pleasure in other things you once enjoyed. The addiction is more powerful.  If you keep using the substance, your brain makes less of its own feel-good chemicals. You then must keep using drugs or alcohol to try to make up for the low levels of the brain chemicals. Over time your brain needs more and more of the drug or alcohol to achieve this. You need the drug. You no longer think about the physical, emotional, and social harm it causes.     Can you become addicted to things other than drugs or alcohol?  Addiction can happen in response to other pleasurable things that stimulate the brain s reward center. These things include eating, having sex, gambling, using tobacco, and using the internet.     Can you get control over a brain disease?  The only way to get over an addiction is to stop using the substance. Not using it lets your brain recover and go back to its normal functioning.  You can relearn how to find pleasure in other things again. But your brain will always be at risk for addiction. Addiction is very powerful. So you usually will need medical help and social support for long-term success.  Addiction is a chronic condition. It s common for people who are recovering from addiction to start using the substance again (called a relapse). This doesn t mean that treatment doesn t work. Just like other chronic health conditions, addiction requires ongoing treatment that changes as the person s needs change.      Emergency Plan Recommendation:     When to Use the Emergency Department (ED)  An emergency means you could die if you don t get care quickly. Or you could be hurt permanently (disabled). Read below to know when to use -- and when not to use -- an emergency department (also called ED).     Dangers to your life  Here are examples of emergencies. These need immediate care:  A hard time breathing  Severe chest pain  Choking  Severe bleeding  Suddenly not able to move or speak  Blacking out (fainting)`  Poisoning     Dangers of permanent injuries  Here are other emergencies. These also need immediate care:  Deep cuts or severe burns  Broken bones, or sudden severe pain and swelling in a joint     When it s an emergency  If you have an emergency, follow these steps:     1. Go to the nearest emergency department  If you can, go to the hospital ED closest to you right away.  If you cannot get there right away, or if it is not safe to take yourself, call 911 or your police emergency number.  2. Call your primary care doctor  Tell your doctor about the emergency. Call within 24 hours of going to the ED.  If you cannot call, have someone call for you.  Go to your doctor (not the ED) for any follow-up care.     When it s not an emergency  If a problem is not an emergency, follow these steps:     1. Call your primary care doctor  If you don t know the name of your doctor, call your health  plan.  If you cannot call, have someone call for you.  2. Follow instructions  Your doctor will tell you what you should do.  You may be told to see your doctor right away. You may be told to go to the ED. Or you may be told to go to an urgent care center.  Follow your doctor s advice.

## 2021-05-28 NOTE — PROGRESS NOTES
Assessment  SW met briefly with pt while she was in clinic for OB visit.  Pt says she left message for adoption resource.  She does not seem to want to talk about this.  SW reminded pt that it is okay if she does not decide until after baby is born- only that it can help her make her decision if she learns about the adoption process ahead of time. Pt was brief in her responses and not open to further discussion with SW.    SW gave pt [facility] brochure and asked if she would like to enroll.  She says she would like [facility] follow up.  Advised pt she needs to talk to care guide to complete enrollment.  Confirmed contact information.     Action Plan:    will:  1)  Available as needed      Care Guide will:  Follow standard work to complete enrollment

## 2021-05-28 NOTE — PROGRESS NOTES
"Clinic Note - Return OB Visit    Mariela Chester is a 23 y.o.  female who presents to clinic for a follow up OB visit. She is currently 35w1d with an estimated date of delivery of 19 via LMP c/w 13 wk US. She denies headache, chest pain, shortness of breath, abdominal pain/contractions, vaginal bleeding, or abnormal discharge. She has felt baby move.     New concerns today:   -\"sick of being pregnant\"  -more vaginal discharge, no itching/burning  -rib pain bilaterally  -states she is still thinking about adoption but I sense she hasn't 100% decided yet, she states she tried calling some place but didn't reach anyone and hasn't tried calling again, meeting with SW today    OB History    Para Term  AB Living   2 1 1     1   SAB TAB Ectopic Multiple Live Births           1      # Outcome Date GA Lbr Rudy/2nd Weight Sex Delivery Anes PTL Lv   2 Current            1 Term 2015    M Vag-Spont EPI N JUAN       Physical exam:  /70   Wt 212 lb (96.2 kg)   LMP 2018   BMI 33.20 kg/m      General: alert, female in no acute distress  Abdomen: gravid uterus appropriate for gestation age at 35 cm above pubic symphysis, non-tender, FHTs 140  Extremities: no edema    Assessment/Plan:  1. Supervision of high risk pregnancy in third trimester   at 35+1 weeks today. Multiple high risk issues affecting this pregnancy including intermittent meth use (during most of first trimester and then again in 2nd trimester), tobacco use, pyelonephritis during pregnancy, thrombocytopenia. Pt is considering putting this baby up for adoption - SW to meet with pt today and assist with getting more information and starting process if desired.  -GBS swab completed today (will need to repeat if pt goes past her due date)    2. Thrombocytopenia during pregnancy (H)  Following with hematology for this issue at [facility]. Recommend weekly CBC starting at 34 weeks - has been stable >80,000 for past 2 " weeks. Platelet count needs to be 30-50,000 range for vaginal delivery; needs range ,000 for epidural - if needed I will prescribe steroids based on hematologist recommendations about a week prior to delivery to increase platelet count.    3. Methamphetamine abuse in remission (H)  Pt is currently participating in intensive outpatient treatment for her meth use - no relapses reported. Pt states through the program she is having drug testing done twice per week. CPS has already been contacted this pregnancy, will contact them again upon delivery or sooner if new concerns arise. Will plan for UDS at hospital and possibly intermittently in clinic. Will plan to deliver at [facility] given capabilities for  care and monitoring, mom agrees with this plan.    4. Moderate episode of recurrent major depressive disorder (H)  Will continue lexapro 30mg until after delivery and then may choose to switch to alternative SSRI if needed to control symptoms.    5. Syphilis affecting pregnancy in third trimester  Tested positive for syphilis on 3/22/19 on 2nd tri screen, treated with PCN.     6. Pyelonephritis affecting pregnancy in second trimester  Pt with a hx of pyelonephritis in Dec 2018, which was treated and pt declines further symptoms. Pt was not started on prophylactic antibiotic therapy at that time and I see no reason to do so now; however, if pt develops one more UTI during this pregnancy I would plan for ppx antibiotics until delivery.    7. Tobacco smoking affecting pregnancy in third trimester  Pt is currently smoking 1 ppd, she states she is unable to quit right now. Again reviewed potential risks and recommendation to quit.    8.  Vaginal discharge  Patient reports increased vaginal discharge though no itching/burning.  Wet prep obtained today for further evaluation, will treat based on results.  Wet prep negative for yeast, BV and trichomoniasis; discussed likely normal physiologic discharge,  will retest as needed if symptoms change.      Follow up in 1 week for routine prenatal care.    Amanda Chery MD

## 2021-05-28 NOTE — PROGRESS NOTES
Clinic Note - Return OB Visit    Mariela Chester is a 23 y.o.  female who presents to clinic for a follow up OB visit. She is currently 33w1d with an estimated date of delivery of  via LMP c/w 13 wk US. She denies headache, chest pain, shortness of breath, abdominal pain/contractions, vaginal bleeding, or abnormal discharge. She has felt baby move.     New concerns today:   -thinking about putting this baby up for adoption - FOB not a good anastasia so wanting to distance from him, thinks would be good for her as she is working on her sobriety and other personal issues, thinks it would be better for baby to be in a stable household  -hematology at  -likely [facility], recommend CBC weekly starting at 34 weeks, will need prednisone prior to delivery potentially to increase platelet levels before delivery  -hx cymbalta, effexor, wellbutrin, zoloft - currently on lexapro 30mg daily, helping but sx not completely managed    OB History    Para Term  AB Living   2 1 1     1   SAB TAB Ectopic Multiple Live Births           1      # Outcome Date GA Lbr Rudy/2nd Weight Sex Delivery Anes PTL Lv   2 Current            1 Term 2015    M Vag-Spont EPI N JUAN       Physical exam:  /80   Pulse 97   Wt 211 lb (95.7 kg)   LMP 2018   BMI 33.05 kg/m      General: alert, female in no acute distress  Abdomen: gravid uterus appropriate for gestation age at 33 cm above pubic symphysis, non-tender, FHTs 155 bpm  Extremities: no edema    Assessment/Plan:  1. Supervision of high risk pregnancy in third trimester   at 33+1 weeks today. Multiple high risk issues affecting this pregnancy including intermittent meth use (during most of first trimester and then again in 2nd trimester), tobacco use, pyelonephritis during pregnancy, thrombocytopenia. Pt now states that she has been considering adoption for this baby and wonders if we have resources for her - care management referral placed today for  assistance.   - Ambulatory referral to Care Management (Primary Care)    2. Thrombocytopenia during pregnancy (H)  Following with hematology for this issue at [facility]. Recommend weekly CBC starting at 34 weeks - will check CBC today and then weekly (standing order placed today). Platelet count needs to be 30-50,000 range for vaginal delivery; needs range ,000 for epidural - hematology will prescribe steroids about a week prior to delivery to increase platelet count if needed.   - HM2(CBC w/o Differential)  - HM2(CBC w/o Differential); Standing    3. Methamphetamine abuse in remission (H)  Pt is currently participating in intensive outpatient treatment for her meth use - no relapses. Pt states through the program she is having drug testing done twice per week. CPS has already been contacted this pregnancy, will contact them again upon delivery or sooner if new concerns arise. Will plan for UDS at hospital and possibly intermittently in clinic. Will plan to deliver at [facility] given capabilities for  care and monitoring, mom agrees with this plan.  - Ambulatory referral to Care Management (Primary Care)    4. Moderate episode of recurrent major depressive disorder (H)  Currently on lexapro 30mg daily, states this is helping but not completely managing symptoms. Discussed we are at max dose of lexapro so would need to switch to alternative medication is desired - pt states she will continue lexapro 30mg until after delivery and then may choose to switch if needed.   - escitalopram oxalate (LEXAPRO) 20 MG tablet; Take 1.5 tablets (30 mg total) by mouth daily.  Dispense: 60 tablet; Refill: 1    5. Syphilis affecting pregnancy in third trimester  Tested positive for syphilis on 3/22/19 on 2nd tri screen, treated with PCN.   - Ambulatory referral to Care Management (Primary Care)    6. Pyelonephritis affecting pregnancy in second trimester  Pt with a hx of pyelonephritis in Dec 2018, which was  treated and pt declines further symptoms. Pt was not started on prophylactic antibiotic therapy at that time and I see no reason to do so now; however, if pt develops one more UTI during this pregnancy I would plan for ppx antibiotics until delivery.    7. Tobacco smoking affecting pregnancy in third trimester  Pt is currently smoking 1 ppd, she states she is unable to quit right now. Again reviewed potential risks and recommendation to quit.  - Ambulatory referral to Care Management (Primary Care)      Reviewed  care and baby-friendly practices done at hospital after delivery.    Follow up in 2 weeks for routine prenatal visit.     Amanda Chery MD

## 2021-05-28 NOTE — PROGRESS NOTES
Scheduled F/U Call:  Attempt 2  Attempt 1: Care Guide called patient.  If this patient is returning my call please transfer to Nena Regan at ext [phone].  ** Next [facility] Outreach date: 5/16/19  Assessment  SW met briefly with pt while she was in clinic for OB visit.  Pt says she left message for adoption resource.  She does not seem to want to talk about this.  SW reminded pt that it is okay if she does not decide until after baby is born- only that it can help her make her decision if she learns about the adoption process ahead of time. Pt was brief in her responses and not open to further discussion with SW.     SW gave pt [facility] brochure and asked if she would like to enroll.  She says she would like [facility] follow up.  Advised pt she needs to talk to care guide to complete enrollment.  Confirmed contact information.      Action Plan:    will:  1)  Available as needed        Care Guide will:  Follow standard work to complete enrollment       RN Recommendations and Referrals  Financial resource guide consult/follow-up: Please send a referral if not already sent.  [facility] : Phone appointment on 5/2/2019 @ 2:00     Action Plan     RN Will  Will not add the patient to [facility] tracking list     Care Guide Will  Will follow Standard Work  Financial resource guide consult/follow-up: Please send a referral if not already sent.  [facility] : Phone appointment on 5/2/2019 @ 2:00      Emergency Plan  Depression  Everyone feels down at times. The blues are a natural part of life. But an unhappy period that s intense or lasts for more than a couple of weeks can be a sign of depression. Depression is a serious illness. It can disrupt the lives of family and friends. If you know someone you think may be depressed, find out what you can do to help.     Know the serious signals  Warning signals for suicide include:    Threats or talk of suicide    Statements such as  I won t be a problem much longer  or  Nothing  matters     Giving away possessions or making a will or  arrangements    Buying a gun or other weapon    Sudden, unexplained cheerfulness or calm after a period of depression  If you notice any of these signs, get help right away. Call a health care professional, mental health clinic, or suicide hotline and ask what action to take. In an emergency, don t hesitate to call the police.     [facility] Mental Health Crisis Lines:  [facility] [phone]  [facility] [phone]  [facility] [phone]  [facility] [phone]  [facility], Adults [phone]  [facility], Children [phone]  [facility], Adults [phone]  [facility], Children [phone]     [id number]  Understanding the Disease of Addiction  What is addiction?  Addiction is a long-lasting (chronic) disease of the brain. It affects how your brain learns and works.  Your genes and your environment can affect your risk for addiction. A family history of addiction also raises your risk. But anyone can have an addiction.  Unfortunately, many people falsely think that addiction is a moral weakness. They think that people addicted to drugs or alcohol are just behaving badly or making poor choices.     How does addiction affect my brain?  Whether you start using drugs or alcohol is your choice. But once your brain is exposed to the addictive substance, your brain begins to change. This is especially true if you are more at risk for addiction. These brain changes overpower your self-control. This happens because the substance overexcites the brain s reward center. The substance mimics the brain's own natural feel-good chemicals. The brain is rewired into believing that the substance is a good thing and that you need it to survive. This rewiring is very strong. Over time, you no longer find pleasure in other things you once enjoyed. The addiction is more powerful.  If you keep using the substance, your brain makes  less of its own feel-good chemicals. You then must keep using drugs or alcohol to try to make up for the low levels of the brain chemicals. Over time your brain needs more and more of the drug or alcohol to achieve this. You need the drug. You no longer think about the physical, emotional, and social harm it causes.     Can you become addicted to things other than drugs or alcohol?  Addiction can happen in response to other pleasurable things that stimulate the brain s reward center. These things include eating, having sex, gambling, using tobacco, and using the internet.     Can you get control over a brain disease?  The only way to get over an addiction is to stop using the substance. Not using it lets your brain recover and go back to its normal functioning. You can relearn how to find pleasure in other things again. But your brain will always be at risk for addiction. Addiction is very powerful. So you usually will need medical help and social support for long-term success.  Addiction is a chronic condition. It s common for people who are recovering from addiction to start using the substance again (called a relapse). This doesn t mean that treatment doesn t work. Just like other chronic health conditions, addiction requires ongoing treatment that changes as the person s needs change.      Emergency Plan Recommendation:     When to Use the Emergency Department (ED)  An emergency means you could die if you don t get care quickly. Or you could be hurt permanently (disabled). Read below to know when to use -- and when not to use -- an emergency department (also called ED).     Dangers to your life  Here are examples of emergencies. These need immediate care:  A hard time breathing  Severe chest pain  Choking  Severe bleeding  Suddenly not able to move or speak  Blacking out (fainting)`  Poisoning     Dangers of permanent injuries  Here are other emergencies. These also need immediate care:  Deep cuts or severe  burns  Broken bones, or sudden severe pain and swelling in a joint     When it s an emergency  If you have an emergency, follow these steps:     1. Go to the nearest emergency department  If you can, go to the hospital ED closest to you right away.  If you cannot get there right away, or if it is not safe to take yourself, call 911 or your police emergency number.  2. Call your primary care doctor  Tell your doctor about the emergency. Call within 24 hours of going to the ED.  If you cannot call, have someone call for you.  Go to your doctor (not the ED) for any follow-up care.     When it s not an emergency  If a problem is not an emergency, follow these steps:     1. Call your primary care doctor  If you don t know the name of your doctor, call your health plan.  If you cannot call, have someone call for you.  2. Follow instructions  Your doctor will tell you what you should do.  You may be told to see your doctor right away. You may be told to go to the ED. Or you may be told to go to an urgent care center.  Follow your doctor s advice.

## 2021-05-28 NOTE — PATIENT INSTRUCTIONS - HE
Phone Numbers:  [facility]: [phone]  [facility]: [phone]    Heartburn:  -ok to use TUMS as needed  -if becoming a significant, daily issue then consider zantac 150mg 1-2 times per day    Can check cervix next week after you are 37 weeks along     When to call or come in to the hospital  If you notice a decrease in your baby's movement.   If your begin to experience contractions that are 5 minutes or less apart and lasting for over 45 seconds, or if contractions are becoming more painful.  If you have any bleeding or leakage of fluids.   If you have a headache not resolved with tylenol, right upper abdominal pain, or sudden onset of swelling.  You know your body best. Never hesitate to call or go to the hospital if something doesn't feel right!    Preparing for the hospital  You re likely feeling anxious as your child s birth approaches. This is normal. To give yourself some peace of mind, pack a bag 3-4 weeks before your due date. Here is a list of things to remember:  Personal care items like toothbrush, hair brush, lip balm, lotion, shampoo, glasses, contacts  Nightgown, bathrobe, slippers  Several pairs of underwear  Comfortable clothes for you to wear home  Camera with new batteries  Cell phone and   Insurance information and any other paperwork needed for your hospital stay  Clothes for baby to wear home  An infant, rear-facing car seat for bringing home your baby (this is required by law)    [facility] Feeding in the Hospital: Information, Support and      Resources    As you prepare for the birth of your child, you will want to consider options for feeding your baby cluding breast-feeding and/or baby formula. The American Academy of Pediatrics recommends exclusive breast-feeding for the first six months (although any amount of breast-feeding is beneficial).  However, we also understand that breast-feeding is  a personal choice and not for everyone. Whether or not you choose to breast-feed,  your decision will be respected by our staff.        There are numerous benefits of breast-feeding; here are a few to consider:    Provides antibodies to protect your baby from infections and diseases    The cost: formula can cost over $1,500 per year    Convenience, no warming up or sterilizing bottles and supplies    The physical contact with breastfeeding can make babies feel secure, warm and comforted    Whatever my feeding choice, what can I expect after I deliver my baby?    Your baby will usually be placed skin-to-skin immediately following birth. The skin to skin contact between you and your baby will be a special and memorable time. The bonding and attachment comforts your baby and has a positive effect on baby s brain development.     Having your baby  room in  with you also helps you start to learn your baby s body rhythms and sleep cycle.      You will also begin to learn your baby s cues (signals) that he or she is ready to feed.    When do I start to feed my baby?   As soon as possible after your baby s birth, you will be encouraged to begin feeding. In the first couple of weeks, your baby will eat often. Breastfeeding babies usually eat at least 8 times in 24 hours. Babies fed formula usually eat at least 7 times in 24 hours.      Breast-feeding tips:    Get comfortable and use pillows for support.    Have your baby at the level of your breast, facing you,  tummy to tummy.       Touch your nipple to your baby s lips so your baby s mouth opens wide (rooting reflex).  Aim the nipple toward the roof of your baby s mouth. When your baby opens his or her mouth, pull your baby toward your breast to help your baby  latch on  to your nipple and much of the areola area.    Hand expressing your breast milk can assist with latching your baby to your breast, if needed.    Ask for help, breastfeeding may seem awkward or uncomfortable at first, this is normal. There are numerous resources available.    Mixing  breastfeeding and formula can interfere with how you begin building your milk supply. It can impact how you and your baby learn to breastfeeding together and alter the natural growth of  good  bacteria in your baby s stomach.    Ask your nurse to show you how to hand express.     Breast milk can be kept in the refrigerator orfreezer for later use.     Hospital Resources:  [facility] L[facility]ation Clinics located at [facility], [facility] and [facility]  Call: [phone].    Inpatient support    Outpatient appointments    Telephone consultation    Breast-feeding classes available through [employer]      Online Resources:    [email or web address] sign up for free online weekly e-mail    [email or web address]    [email or web address]    Llli.[email or web address] (La Leche League)    [email or web address]    [email or web address]    [email or web address].com    Breast-feeding Supplies & Pumps:  Talk to your insurance provider or WIC (Women, Infants and Children) to learn more about options available to you. Recent health insurance changes may include additional coverage for supplies and pumps.    Public Health:  Women, Infants and Children Nutrition program (WIC): provides breast-feeding support and education in addition to formal feeding moms. 207-MSZ-7762 or [email or web address]    Family Health Home Visiting: Public Health Nurse home visits are available. Talk to your provider to see if you qualify. Most [facility] have a program available.    Additional Resources:  La Leche League is an international, nonprofit, nonsectarian organization offering information, education, and support to mothers who want to breast-feed their babies. Local groups offer phone help and monthly meetings. Visit [employer] or [employer] and  the  Find local support  drop down menu or click on the  Resources  tab.    [place] Breastfeeding Resources: 0-607-636-BABY (6707) toll free    National  Breastfeeding Help Line trained breastfeeding peer counselors can help answer common breast-feeding questions by phone. Monday-Friday: English/Ukrainian  [phone]- [phone] toll free, [phone] (TTJEN)

## 2021-05-28 NOTE — PATIENT INSTRUCTIONS - HE
Phone Numbers:  [facility]: [phone]  [facility]: [phone]     When to call or come in to the hospital   If you notice a decrease in your baby's movement.   If your begin to experience contractions that are 5 minutes or less apart and lasting for over 45 seconds, or if contractions are becoming more painful.  If you have any bleeding or leakage of fluids.   If you have a headache not resolved with tylenol, right upper abdominal pain, or sudden onset of swelling.  You know your body best. Never hesitate to call or go to the hospital if something doesn't feel right!    After-baby Birth Control Methods   It is important to consider contraception after your baby is born if you would like to prevent a pregnancy in the near future. If you are breast feeding, talk with your doctor to determine the best method for you. It is recommended that you wait 12 months after the birth of your baby to get pregnant again.   Natural Family Planning  It is possible to avoid pregnancy or time them based on your family goals without any hormones or medications or need for condoms. In order to be successful with this method, both partners need to be diligent in tracking fertility/ovulation and abiding by abstinence during certain times of the month to avoid pregnancy.  Condoms   Latex condoms can prevent pregnancy and STDs. Condoms work best when used with spermicide that is placed inside the vagina as well as inside the condom. Use only water-based lubricants. Petroleum based products (such as Vaseline and many massage oils) can weaken the latex and cause it to break.   IUD   IUD's are made of flexible plastic and must be inserted into your uterus by a doctor. The IUD works by stopping the fertilized egg from attaching itself to the uterus. IUDs may increase the risk of getting a pelvic infection (PID), which can lead to infertility if not diagnosed and treated early. This is a great option after delivery of your baby! These  last for 3, 5, or 10 years depending up on which type you choose, but can be removed earlier if you decide you would like to get pregnant sooner.  Tubal Ligation & Vasectomy   These are good choices for women and men who know that they do not want to have any more children.     HORMONES   Birth control pills, hormone implants and shots work by stopping ovulation (release of the egg from the ovary). Implants are placed in the upper arm by a minor surgical incision. They last for three years, but can be removed by your doctor if you decide to get pregnant. Hormone injections must be repeated every three months. The Pill must be taken every day.   All hormones can have side effects and create certain health risks. They are very effective in preventing pregnancy but they do not prevent STDs. You can talk more about the risks and benefits with your doctor.     Controlling Back Pain  As your body changes during pregnancy, your back must work in new ways. Back pain is due to many causes. Physical changes in your body can strain your back and its supporting muscles. Also, hormones (chemicals that carry messages throughout the body) increase during pregnancy. This can affect how the muscles and joints work together. All of these changes can lead to pain in the upper or lower back or pelvis. Some pregnant women have sciatica, pain caused by pressure on the sciatic nerve running down the back of the leg. Ask your healthcare provider for specific tips and exercises to help control your back pain.    Tips to Help You Rest  Good rest and sleep will help you feel better. Here are some ideas:  Ask your partner to massage your shoulders, neck, or back.  Limit the errands you do each day.  Lie down in the afternoon or after work for a few minutes.  Take a warm bath before you go to sleep.  Drink warm milk or teas without caffeine.  Avoid coffee, black tea, and cola.    Preventing Heartburn  Avoid spicy or acidic foods.   Eat small  amounts more often.  Wait 2 hours after eating before lying down   Sleep with your upper body raised 6 inches.  May use Tums as needed. Talk to your doctor about other medications to try.

## 2021-05-28 NOTE — PROGRESS NOTES
Clinic Note - Return OB Visit    Mariela Chester is a 23 y.o.  female who presents to clinic for a follow up OB visit. She is currently 36w2d with an estimated date of delivery of 19 via LMP c/w 13 wk US. She denies headache, chest pain, shortness of breath, abdominal pain/contractions, vaginal bleeding, or abnormal discharge. She has felt baby move.     New concerns today:   -tired all the time, ready to be done with pregnancy  -thigh pain  -heartburn - not using anything right now   -still not decided on adoption, shares today that part of her reason for considering this was that her mom said she cannot live at her mom's house with 2 children, but patient states she does not want to make such a big decision based on that    OB History    Para Term  AB Living   2 1 1     1   SAB TAB Ectopic Multiple Live Births           1      # Outcome Date GA Lbr Rudy/2nd Weight Sex Delivery Anes PTL Lv   2 Current            1 Term     M Vag-Spont EPI N JUAN       Physical exam:  /62   Pulse (!) 125   Wt 220 lb 11.2 oz (100.1 kg)   LMP 2018   SpO2 97%   BMI 34.57 kg/m      General: alert, female in no acute distress  Abdomen: gravid uterus appropriate for gestation age at 36 cm above pubic symphysis, non-tender, FHTs 150  Extremities: no edema    Assessment/Plan:  1. Supervision of high risk pregnancy in third trimester   at 36+2 weeks today. Multiple high risk issues affecting this pregnancy including intermittent meth use (during most of first trimester and then again in 2nd trimester), tobacco use, pyelonephritis during pregnancy, thrombocytopenia.   Patient has expressed considering putting this baby up for adoption, social work consulted.  -GBS negative    2. Thrombocytopenia during pregnancy (H)  Following with hematology for this issue at [facility]. Recommend weekly CBC starting at 34 weeks - had been stable >80,000 but then was 59,000 last week - recheck  again today. Platelet count needs to be 30-50,000 range for vaginal delivery; needs range ,000 for epidural - if needed I will prescribe steroids based on hematologist recommendations about a week prior to delivery to increase platelet count.    3. Methamphetamine abuse in remission (H)  Pt is currently participating in intensive outpatient treatment for her meth use - no relapses reported. Pt states through the program she is having drug testing done twice per week. CPS has already been contacted this pregnancy, will contact them again upon delivery or sooner if new concerns arise. Will plan for UDS at hospital and possibly intermittently in clinic. Will plan to deliver at [facility] given capabilities for  care and monitoring, mom agrees with this plan.    4. Moderate episode of recurrent major depressive disorder (H)  Will continue lexapro 30mg until after delivery and then may choose to switch to alternative SSRI if needed to control symptoms.    5. Syphilis affecting pregnancy in third trimester  Tested positive for syphilis on 3/22/19 on 2nd tri screen, treated with PCN.     6. Pyelonephritis affecting pregnancy in second trimester  Pt with a hx of pyelonephritis in Dec 2018, which was treated and pt declines further symptoms. Pt was not started on prophylactic antibiotic therapy at that time and I see no reason to do so now; however, if pt develops one more UTI during this pregnancy I would plan for ppx antibiotics until delivery.    7. Tobacco smoking affecting pregnancy in third trimester  Pt is currently smoking 1 ppd, she states she is unable to quit right now. Again reviewed potential risks and recommendation to quit.      Follow up in 1 week for routine prenatal care.    Amanda Chery MD

## 2021-05-28 NOTE — PROGRESS NOTES
The Clinic Care Guide met the patient today in the clinic at the request of the PCP to discuss possible clinic care coordination enrollment. Clinic care coordination was described to the patient and immediate needs were discussed. The patient agreed to enrollment in clinic care coordination and future appointments were scheduled for an RN care coordination assessment. The patient was provided with contact information for the clinic care guide.    - Patient is thinking about putting baby up for adoption.-May want assistance with this.    Class: Internal Reason(s): Continuity of Care [6]   Diagnosis: O09.93 (ICD-10-CM) - Supervision of high risk pregnancy in third trimester  F15.11 (ICD-10-CM) - Methamphetamine abuse in remission (H)  O98.113 (ICD-10-CM) - Syphilis affecting pregnancy in third trimester  O99.333 (ICD-10-CM) - Tobacco smoking affecting pregnancy in third trimester Procedure: [id number] - AMB REFERRAL TO CARE MANAGEMENT (PRIMARY CARE)   Start: 04/25/2019 Expiration: 10/22/2019   Requested: 1 Authorized: 1   Scheduled:   Completed:     Referring Location: [facility] Referred to Location:     Referring Department: [facility]/OB Referred To Department:     Referring Provider: LEV RODRIGUEZ Referred To Provider:

## 2021-05-28 NOTE — PROGRESS NOTES
"[facility] Social Work Phone Visit:    SW spoke to pt- confirmed she received adoption resource while in clinic this week.  Discussed next steps of calling adoption resources to learn more.  She has not yet made this call as she says she is \"undecided\" on adoption.  SW advised pt that she does not need to have made up her mind before she calls.  SW advised pt to trust her gut and instincts when calling to discuss adoption process.  Encouraged pt to remain self aware if it feels like the right choice for her.      Pt seeing OB Thursday May 9 at 2:40pm.  SW will be available to do brief check in and provide support while pt is in the clinic.   "

## 2021-05-28 NOTE — PROGRESS NOTES
Scheduled F/U Call: Attempt 3  Attempt 3: Care Guide called patient.  If this patient is returning my call please transfer to Nena Marieger at ext [phone]. I have called (patient) 3 times over the past two weeks and have been unsuccessful in reaching his/her. This patient has also not returned any of my messages.   I will continue attempting to reach out to this patient in one month. I will also check this patient s chart for upcoming appointments, ER reports that may contain a new phone number, or any other recent activity  ** Next [facility] Outreach date: 6/17/19  Assessment  SW met briefly with pt while she was in clinic for OB visit.  Pt says she left message for adoption resource.  She does not seem to want to talk about this.  SW reminded pt that it is okay if she does not decide until after baby is born- only that it can help her make her decision if she learns about the adoption process ahead of time. Pt was brief in her responses and not open to further discussion with SW.     SW gave pt [facility] brochure and asked if she would like to enroll.  She says she would like [facility] follow up.  Advised pt she needs to talk to care guide to complete enrollment.  Confirmed contact information.      Action Plan:    will:  1)  Available as needed        Care Guide will:  Follow standard work to complete enrollment       RN Recommendations and Referrals  Financial resource guide consult/follow-up: Please send a referral if not already sent.  [facility] : Phone appointment on 5/2/2019 @ 2:00     Action Plan     RN Will  Will not add the patient to [facility] tracking list     Care Guide Will  Will follow Standard Work  Financial resource guide consult/follow-up: Please send a referral if not already sent.  [facility] : Phone appointment on 5/2/2019 @ 2:00      Emergency Plan  Depression  Everyone feels down at times. The blues are a natural part of life. But an unhappy period that s intense or lasts for more than a  couple of weeks can be a sign of depression. Depression is a serious illness. It can disrupt the lives of family and friends. If you know someone you think may be depressed, find out what you can do to help.     Know the serious signals  Warning signals for suicide include:    Threats or talk of suicide    Statements such as  I won t be a problem much longer  or  Nothing matters     Giving away possessions or making a will or  arrangements    Buying a gun or other weapon    Sudden, unexplained cheerfulness or calm after a period of depression  If you notice any of these signs, get help right away. Call a health care professional, mental health clinic, or suicide hotline and ask what action to take. In an emergency, don t hesitate to call the police.     [facility] Mental Health Crisis Lines:  [facility] [phone]  [facility] [phone]  [facility] [phone]  [facility] [phone]  [facility], Adults [phone]  [facility], [facility] [phone]  [facility] [phone]  [facility], [facility] [phone]     [id number]  Understanding the Disease of Addiction  What is addiction?  Addiction is a long-lasting (chronic) disease of the brain. It affects how your brain learns and works.  Your genes and your environment can affect your risk for addiction. A family history of addiction also raises your risk. But anyone can have an addiction.  Unfortunately, many people falsely think that addiction is a moral weakness. They think that people addicted to drugs or alcohol are just behaving badly or making poor choices.     How does addiction affect my brain?  Whether you start using drugs or alcohol is your choice. But once your brain is exposed to the addictive substance, your brain begins to change. This is especially true if you are more at risk for addiction. These brain changes overpower your self-control. This happens because the substance overexcites the  brain s reward center. The substance mimics the brain's own natural feel-good chemicals. The brain is rewired into believing that the substance is a good thing and that you need it to survive. This rewiring is very strong. Over time, you no longer find pleasure in other things you once enjoyed. The addiction is more powerful.  If you keep using the substance, your brain makes less of its own feel-good chemicals. You then must keep using drugs or alcohol to try to make up for the low levels of the brain chemicals. Over time your brain needs more and more of the drug or alcohol to achieve this. You need the drug. You no longer think about the physical, emotional, and social harm it causes.     Can you become addicted to things other than drugs or alcohol?  Addiction can happen in response to other pleasurable things that stimulate the brain s reward center. These things include eating, having sex, gambling, using tobacco, and using the internet.     Can you get control over a brain disease?  The only way to get over an addiction is to stop using the substance. Not using it lets your brain recover and go back to its normal functioning. You can relearn how to find pleasure in other things again. But your brain will always be at risk for addiction. Addiction is very powerful. So you usually will need medical help and social support for long-term success.  Addiction is a chronic condition. It s common for people who are recovering from addiction to start using the substance again (called a relapse). This doesn t mean that treatment doesn t work. Just like other chronic health conditions, addiction requires ongoing treatment that changes as the person s needs change.      Emergency Plan Recommendation:     When to Use the Emergency Department (ED)  An emergency means you could die if you don t get care quickly. Or you could be hurt permanently (disabled). Read below to know when to use -- and when not to use -- an  emergency department (also called ED).     Dangers to your life  Here are examples of emergencies. These need immediate care:  A hard time breathing  Severe chest pain  Choking  Severe bleeding  Suddenly not able to move or speak  Blacking out (fainting)`  Poisoning     Dangers of permanent injuries  Here are other emergencies. These also need immediate care:  Deep cuts or severe burns  Broken bones, or sudden severe pain and swelling in a joint     When it s an emergency  If you have an emergency, follow these steps:     1. Go to the nearest emergency department  If you can, go to the hospital ED closest to you right away.  If you cannot get there right away, or if it is not safe to take yourself, call 911 or your police emergency number.  2. Call your primary care doctor  Tell your doctor about the emergency. Call within 24 hours of going to the ED.  If you cannot call, have someone call for you.  Go to your doctor (not the ED) for any follow-up care.     When it s not an emergency  If a problem is not an emergency, follow these steps:     1. Call your primary care doctor  If you don t know the name of your doctor, call your health plan.  If you cannot call, have someone call for you.  2. Follow instructions  Your doctor will tell you what you should do.  You may be told to see your doctor right away. You may be told to go to the ED. Or you may be told to go to an urgent care center.  Follow your doctor s advice.

## 2021-05-28 NOTE — PROGRESS NOTES
23yr F PMH: supervision of high risk pregnancy, thrombocytopenia, add, major depressive disorder, meth abuse in remission sober since Feb 19', syphilis.  Currently lives with her parents & 3 yr old son.  33 wks pregnant and looking to place her unborn child for adoption.  Currently in outpatient treatment 3x's a week for meth abuse.  States she also sees a mental health counselor as part of this treatment.  Lives in [place] and receives food stamps, child support, healthcare & W-2 to assist with job placement.  Writer gave Mariela the following brochure that was provided by [facility]. http://adoptionmn.wutabout/wp-content/uploads/[email or web address]/03/DkionHY-[email or web address]-Tpgoycxu.pdf   Patient very appreciative of the information.  Mariela stating she also has multiple medical bills with [employer] [facility].  [facility] FRG was available to come in to meet with patient during RN Assessment.  FRG referral will still need to be sent per standard work.  Patient enrolled per goals as listed below.      RN Recommendations and Referrals  Financial resource guide consult/follow-up: Please send a referral if not already sent.  CCC : Phone appointment on 5/2/2019 @ 2:00    Action Plan    RN Will  Will not add the patient to [facility] tracking list    Care Guide Will  Will follow Standard Work  Financial resource guide consult/follow-up: Please send a referral if not already sent.  CCC : Phone appointment on 5/2/2019 @ 2:00    Goals  Goals        Patient Stated      I would like financial assistance with my medical bills. (pt-stated)      Action steps to achieve this goal  1.  I will meet with the [facility] FRG.  2.  I will submit any paperwork in a timely manner.    Date goal set:  4/30/2019        I would like information about placing my unborn child for adoption in the next 30 days (pt-stated)      Action steps to achieve this goal  1.  I will speak with the [facility]  to learn what my options are regarding my pregnancy.    Date goal  "set:  4/30/2019            Clinic Care Coordination RN Assessed Needs  Patient Centered Assessment Method-PCAM TOTAL SCORE: 26 (4/30/2019  2:45 PM)    Level 2:  A score of 25-36 indicates that the patient has a moderate initial need for RN or SW intervention at the discretion of the .  The RN will add this patient to her panel and follow closely in partnership with the care guide until stable.  She will reach out to the care guide for support in care coordination needs and graduate the patient to standard care guide outreach when appropriate.      PCAM (Patient Centered Assessment Method)   HEALTH AND WELL-BEING  Other Physical Health Concerns:: thrombocytopenia, high risk pregnancy, add, mdd, meth abuse   RN Assessment: Physical Health Needs: Mild vague physical symptoms or problems- but do not impact on daily life or are not of concern to client  RN Assessment: Physical Health Problems: Mild impact on mental well-being e.g. \"feeling fed-up\", \"reduced enjoyment\"  RN Assessment:Other Mental Well-Being Concern: Mild problems- don't interfere with function  RN Assessment: Lifestyle Behaviors: Mod to severe impact on client's well-being, preventing enjoyment of usual activities  SOCIAL ENVIRONMENT  RN Assessment: Home Environment: Safe, stable, but with some inconsistency  RN Assessment: Daily Activites: Adequate participation with social networks  RN Assessment: Social Network: Adequate participation with social networks  RN Assessment: Financial Resources: Financially insecure, some resource challenges  HEALTH LITERACY AND COMMUNICATION  RN Assessment: Health Literacy: Reasonable to good understanding but do not feel able to engage with advice at this time  RN Assessment: Engagement: Adequate communication, with or without minor barriers  SERVICE COORDINATION  Other Services: Other care/services in place and adequate  Coordination of Services: Required care/services in place and adequately coordinated  PCAM " TOTAL SCORE: 26      Emergency Plan  Depression  Everyone feels down at times. The blues are a natural part of life. But an unhappy period that s intense or lasts for more than a couple of weeks can be a sign of depression. Depression is a serious illness. It can disrupt the lives of family and friends. If you know someone you think may be depressed, find out what you can do to help.    Know the serious signals  Warning signals for suicide include:    Threats or talk of suicide    Statements such as  I won t be a problem much longer  or  Nothing matters     Giving away possessions or making a will or  arrangements    Buying a gun or other weapon    Sudden, unexplained cheerfulness or calm after a period of depression  If you notice any of these signs, get help right away. Call a health care professional, mental health clinic, or suicide hotline and ask what action to take. In an emergency, don t hesitate to call the police.    [facility] Mental Health Crisis Lines:  [facility] [phone]  [facility] [phone]  [facility] [phone]  [facility] [phone]  [facility], Adults [phone]  [facility], Children [phone]  [facility], Adults [phone]  [facility], Children [phone]    [id number]  Understanding the Disease of Addiction  What is addiction?  Addiction is a long-lasting (chronic) disease of the brain. It affects how your brain learns and works.  Your genes and your environment can affect your risk for addiction. A family history of addiction also raises your risk. But anyone can have an addiction.  Unfortunately, many people falsely think that addiction is a moral weakness. They think that people addicted to drugs or alcohol are just behaving badly or making poor choices.    How does addiction affect my brain?  Whether you start using drugs or alcohol is your choice. But once your brain is exposed to the addictive substance, your brain begins to  change. This is especially true if you are more at risk for addiction. These brain changes overpower your self-control. This happens because the substance overexcites the brain s reward center. The substance mimics the brain's own natural feel-good chemicals. The brain is rewired into believing that the substance is a good thing and that you need it to survive. This rewiring is very strong. Over time, you no longer find pleasure in other things you once enjoyed. The addiction is more powerful.  If you keep using the substance, your brain makes less of its own feel-good chemicals. You then must keep using drugs or alcohol to try to make up for the low levels of the brain chemicals. Over time your brain needs more and more of the drug or alcohol to achieve this. You need the drug. You no longer think about the physical, emotional, and social harm it causes.    Can you become addicted to things other than drugs or alcohol?  Addiction can happen in response to other pleasurable things that stimulate the brain s reward center. These things include eating, having sex, gambling, using tobacco, and using the internet.    Can you get control over a brain disease?  The only way to get over an addiction is to stop using the substance. Not using it lets your brain recover and go back to its normal functioning. You can relearn how to find pleasure in other things again. But your brain will always be at risk for addiction. Addiction is very powerful. So you usually will need medical help and social support for long-term success.  Addiction is a chronic condition. It s common for people who are recovering from addiction to start using the substance again (called a relapse). This doesn t mean that treatment doesn t work. Just like other chronic health conditions, addiction requires ongoing treatment that changes as the person s needs change.     Emergency Plan Recommendation:    When to Use the Emergency Department (ED)  An  emergency means you could die if you don t get care quickly. Or you could be hurt permanently (disabled). Read below to know when to use -- and when not to use -- an emergency department (also called ED).    Dangers to your life  Here are examples of emergencies. These need immediate care:  A hard time breathing  Severe chest pain  Choking  Severe bleeding  Suddenly not able to move or speak  Blacking out (fainting)`  Poisoning    Dangers of permanent injuries  Here are other emergencies. These also need immediate care:  Deep cuts or severe burns  Broken bones, or sudden severe pain and swelling in a joint    When it s an emergency  If you have an emergency, follow these steps:    1. Go to the nearest emergency department  If you can, go to the hospital ED closest to you right away.  If you cannot get there right away, or if it is not safe to take yourself, call 911 or your police emergency number.  2. Call your primary care doctor  Tell your doctor about the emergency. Call within 24 hours of going to the ED.  If you cannot call, have someone call for you.  Go to your doctor (not the ED) for any follow-up care.    When it s not an emergency  If a problem is not an emergency, follow these steps:    1. Call your primary care doctor  If you don t know the name of your doctor, call your health plan.  If you cannot call, have someone call for you.  2. Follow instructions  Your doctor will tell you what you should do.  You may be told to see your doctor right away. You may be told to go to the ED. Or you may be told to go to an urgent care center.  Follow your doctor s advice.

## 2021-05-28 NOTE — PATIENT INSTRUCTIONS - HE
Phone Numbers:  [facility]: [phone]  [facility]: [phone]     When to call or come in to the hospital  If you notice a decrease in your baby's movement.   If your begin to experience contractions that are 5 minutes or less apart and lasting for over 45 seconds, or if contractions are becoming more painful.  If you have any bleeding or leakage of fluids.   If you have a headache not resolved with tylenol, right upper abdominal pain, or sudden onset of swelling.  You know your body best. Never hesitate to call or go to the hospital if something doesn't feel right!    Preparing for the hospital  You re likely feeling anxious as your child s birth approaches. This is normal. To give yourself some peace of mind, pack a bag 3-4 weeks before your due date. Here is a list of things to remember:  Personal care items like toothbrush, hair brush, lip balm, lotion, shampoo, glasses, contacts  Nightgown, bathrobe, slippers  Several pairs of underwear  Comfortable clothes for you to wear home  Camera with new batteries  Cell phone and   Insurance information and any other paperwork needed for your hospital stay  Clothes for baby to wear home  An infant, rear-facing car seat for bringing home your baby (this is required by law)    Managing Labor Pain   There are many ways to manage pain during labor. It can often be done with no anesthesia or strong pain medications. Talk to your health care provider about any options you would like to explore.     Help from Relaxation  Some of these are learned in special classes. Your health care provider can help you find classes. The hospital has a tub you can use during early labor. These methods may be of help to you:   Breathing techniques   A warm tub between contractions   Massage and therapeutic touch by your support person or labor    Reading materials that are comforting or inspiring   Music that is soothing   Hypnosis   Acupuncture and acupressure   Heat and  cold application    Help From Analgesics   Analgesics are mild medications that reduce pain. They can be used along with some relaxation methods. They can give you pain relief without total loss of feeling. They may lessen the pain of strong contractions. You may feel well enough to nap between contractions. They have little effect on your baby if given early in labor. This may be done by injection or by IV.     Help From Anesthetics   Anesthesia involves blockage of all feeling including pain. It can be given in the form of local anesthesia or general anesthesia.  Anesthesia is a type of medication to prevent pain. It is often used in labor. It may numb only one region of your body. This is called regional anesthesia. Or it may let you sleep during surgery. This is called general anesthesia. This type of medication is given by a trained specialist. When possible, regional anesthesia will be used. This is so you can be awake during your baby s birth.     Regional Anesthesia   Regional anesthesia may be used to numb your lower body for a vaginal or  birth. It does not go into your bloodstream. This means that little or none of it will reach your baby. There are two kinds:   Epidural. This is most often given while you sit up or lie on your side. A needle with a flexible tube (catheter) is put in your lower back. The needle is then removed. The anesthetic is sent through the catheter. A pump may be attached. This gives you a constant level of anesthetic. An epidural often only partly affects muscle control. This means you will still be able to push for a vaginal birth.   Spinal. This is most often given in one dose right before delivery. It acts fast. You may sit up or lie down when it is injected. It may affect muscle control in your lower body. This includes the ability to push.    General Anesthesia   General anesthesia lets you sleep and keeps you free of pain during surgery. It may be used for a .  It may be given as an injection. It may be given as an inhaled gas. Or it may be given as both. Delivery often occurs before the medication has reached the baby.

## 2021-05-29 NOTE — PROGRESS NOTES
Clinic Note - Return OB Visit    Mariela Chester is a 23 y.o.  female who presents to clinic for a follow up OB visit. She is currently 38w1d with an estimated date of delivery of 19 via LMP c/w 13 wk US. She denies headache, chest pain, shortness of breath, abdominal pain/contractions, vaginal bleeding, or abnormal discharge. She has felt baby move.     New concerns today:   -none  -started prednisone    OB History    Para Term  AB Living   2 1 1     1   SAB TAB Ectopic Multiple Live Births           1      # Outcome Date GA Lbr Rudy/2nd Weight Sex Delivery Anes PTL Lv   2 Current            1 Term  39w0d   M Vag-Spont EPI N JUAN       Physical exam:  /70   Wt 216 lb 7 oz (98.2 kg)   LMP 2018   BMI 33.90 kg/m      General: alert, female in no acute distress  Abdomen: gravid uterus appropriate for gestation age at 38 cm above pubic symphysis, non-tender, FHTs 150  Cervix: 50/-2 - no change from last visit, had mild blood on my glove after cervical exam  Extremities: no edema    Assessment/Plan:  1. Supervision of high risk pregnancy in third trimester   at 38+1 weeks today. Multiple high risk issues affecting this pregnancy including intermittent meth use (during most of first trimester and then again in 2nd trimester), tobacco use, pyelonephritis during pregnancy, thrombocytopenia.   Patient has expressed considering putting this baby up for adoption, social work consulted. No cervical change today compared to last visit - mild blood on glove today after cervical check, no hx previa, pt will continue to monitor and if worsens will call L&D.  -GBS negative    2. Thrombocytopenia during pregnancy (H)  Following with hematology for this issue at [facility]. Recommend weekly CBC starting at 34 weeks - recheck again today. Started prednisone 1mg/kg/day last week to increase platelet level before delivery, will taper prednisone after delivery.  Per hematology:  Platelet count needs to be 30-50,000 range for vaginal delivery; needs range ,000 for epidural.    3. Methamphetamine abuse in remission (H)  Pt is currently participating in intensive outpatient treatment for her meth use - no relapses reported. Pt states through the program she is having drug testing done twice per week. CPS has already been contacted this pregnancy, will contact them again upon delivery or sooner if new concerns arise. Will plan for UDS at hospital. Will plan to deliver at [facility] given capabilities for  care and monitoring, mom agrees with this plan.    4. Moderate episode of recurrent major depressive disorder (H)  Will continue lexapro 30mg until after delivery and then may choose to switch to alternative SSRI if needed to control symptoms.    5. Syphilis affecting pregnancy in third trimester  Tested positive for syphilis on 3/22/19 on 2nd tri screen, treated with PCN.     6. Pyelonephritis affecting pregnancy in second trimester  Pt with a hx of pyelonephritis in Dec 2018, which was treated and pt declines further symptoms. Pt was not started on prophylactic antibiotic therapy at that time and I see no reason to do so now; however, if pt develops one more UTI during this pregnancy I would plan for ppx antibiotics until delivery.    7. Tobacco smoking affecting pregnancy in third trimester  Pt is currently smoking 1 ppd, she states she is unable to quit right now. Again reviewed potential risks and recommendation to quit.    Follow up in 1 week for routine prenatal care.    Amanda Chery MD

## 2021-05-29 NOTE — PROGRESS NOTES
Clinic Note - Return OB Visit    Mariela Chester is a 23 y.o.  female who presents to clinic for a follow up OB visit. She is currently 37w0d with an estimated date of delivery of 19 via LMP c/w 13 wk US. She denies headache, chest pain, shortness of breath, abdominal pain/contractions, vaginal bleeding, or abnormal discharge. She has felt baby move.     New concerns today:   -no concerns/changes    OB History    Para Term  AB Living   2 1 1     1   SAB TAB Ectopic Multiple Live Births           1      # Outcome Date GA Lbr Rudy/2nd Weight Sex Delivery Anes PTL Lv   2 Current            1 Term  39w0d   M Vag-Spont EPI N JUAN       Physical exam:  /70   Wt 215 lb 5 oz (97.7 kg)   LMP 2018   BMI 33.72 kg/m      General: alert, female in no acute distress  Abdomen: gravid uterus appropriate for gestation age at 37 cm above pubic symphysis, non-tender, FHTs 155  Cervix: 2/50/-2  Extremities: no edema    Assessment/Plan:  1. Supervision of high risk pregnancy in third trimester   at 37+0 weeks today. Multiple high risk issues affecting this pregnancy including intermittent meth use (during most of first trimester and then again in 2nd trimester), tobacco use, pyelonephritis during pregnancy, thrombocytopenia.   Patient has expressed considering putting this baby up for adoption, social work consulted.  -GBS negative    2. Thrombocytopenia during pregnancy (H)  Following with hematology for this issue at [facility]. Recommend weekly CBC starting at 34 weeks - recheck again today. Depending on platelet level today may consider starting prednisone 1mg/kg/day now as unclear when pt will go into labor, will taper prednisone after delivery.  Per hematology: Platelet count needs to be 30-50,000 range for vaginal delivery; needs range ,000 for epidural.    3. Methamphetamine abuse in remission (H)  Pt is currently participating in intensive outpatient treatment for  her meth use - no relapses reported. Pt states through the program she is having drug testing done twice per week. CPS has already been contacted this pregnancy, will contact them again upon delivery or sooner if new concerns arise. Will plan for UDS at hospital. Will plan to deliver at [facility] given capabilities for  care and monitoring, mom agrees with this plan.    4. Moderate episode of recurrent major depressive disorder (H)  Will continue lexapro 30mg until after delivery and then may choose to switch to alternative SSRI if needed to control symptoms.    5. Syphilis affecting pregnancy in third trimester  Tested positive for syphilis on 3/22/19 on 2nd tri screen, treated with PCN.     6. Pyelonephritis affecting pregnancy in second trimester  Pt with a hx of pyelonephritis in Dec 2018, which was treated and pt declines further symptoms. Pt was not started on prophylactic antibiotic therapy at that time and I see no reason to do so now; however, if pt develops one more UTI during this pregnancy I would plan for ppx antibiotics until delivery.    7. Tobacco smoking affecting pregnancy in third trimester  Pt is currently smoking 1 ppd, she states she is unable to quit right now. Again reviewed potential risks and recommendation to quit.    Follow up in 1 week for routine prenatal care.    Amanda Chery MD

## 2021-05-29 NOTE — PATIENT INSTRUCTIONS - HE
Phone Numbers:  [facility]: [phone]  [facility]: [phone]     When to call or come in to the hospital  If you notice a decrease in your baby's movement.   If your begin to experience contractions that are 5 minutes or less apart and lasting for over 45 seconds, or if contractions are becoming more painful.  If you have any bleeding or leakage of fluids.   If you have a headache not resolved with tylenol, right upper abdominal pain, or sudden onset of swelling.  You know your body best. Never hesitate to call or go to the hospital if something doesn't feel right!    Preparing for the hospital  You re likely feeling anxious as your child s birth approaches. This is normal. To give yourself some peace of mind, pack a bag 3-4 weeks before your due date. Here is a list of things to remember:  Personal care items like toothbrush, hair brush, lip balm, lotion, shampoo, glasses, contacts  Nightgown, bathrobe, slippers  Several pairs of underwear  Comfortable clothes for you to wear home  Camera with new batteries  Cell phone and   Insurance information and any other paperwork needed for your hospital stay  Clothes for baby to wear home  An infant, rear-facing car seat for bringing home your baby (this is required by law)

## 2021-05-29 NOTE — PROGRESS NOTES
Programs Applying For: [facility]  County:  Case #:  [facility] Worker:   Jj #:   PMI #:   Tracking:   Date Applied:     Outreach:   2019: FRG called patient to screen/schedule appointment. Patient would like to meet with FRG after an OB appointment. Patient will email FRG once she makes an OB appointment so FRG can meet patient after. FRG will make outreach call in 2 weeks if an OB appointment isn't made/or email sent to FRG.      Health Insurance Information:       Referral:   Krishna Duenasie,     Could you call Mariela again? She is still interested in working to apply for [facility].     Thank you!   [facility] Application Screenin. How many people in household? 3 (lives with mother and step father)  2. Do you file taxes, who do you file with (need 1040)? Patient doesn't know if parents file her as a dependent, she is assuming no.  3. What is your monthly income (need 2 recent paystubs)? 0.00  4. Do you have a bank account, need recent statement? Patient does not have a bank account

## 2021-05-29 NOTE — PROGRESS NOTES
Email: [email or web address]  Marital Status: Single  Children: 1 son, 5/21/19- 36 weeks pregnant  Born and Raised:   Occupation:   Living Situation: Lives with mom in [place]  Smoking, Alcohol, other:  Services receiving:   [facility] Enrollment: 4/30/19  CCC/[facility] Follow up s: Monthly  Next follow up date: 7/19/19  NO shows for CCC:  [facility] 6/4/19

## 2021-05-29 NOTE — PROGRESS NOTES
Email: [email or web address]  Marital Status: Single  Children: 1 son, 5/21/19- 36 weeks pregnant  Born and Raised:   Occupation:   Living Situation: Lives with mom in [place]  Smoking, Alcohol, other:  Services receiving:   CCC/[facility] Enrollment: 4/30/19  CCC/[facility] Follow up s: Every two weeks  Next follow up date: 6/5/19

## 2021-05-29 NOTE — PROGRESS NOTES
My Clinic Care Coordination Care Plan    [facility]  [address], [address] [place] [address]  [phone]    My Preferred Method of Contact:  Phone: [phone]    My Primary/Preferred Language:  English    Preferred Learning Style:  Pictures/Diagrams    Emergency Contact: Extended Emergency Contact Information  Primary Emergency Contact: ENZO PARRISH Phone: [phone]  Relation: Mother  Secondary Emergency Contact: NONE,PER PT  Relation: Declined     PCP:  Amanda Chery MD  Specialists:    Care Team            Amanda Chery MD   [phone] PCP - General, Family Medicine    [facility] Care Coordination Care Guide, Generic Provider    [facility]. Phone: [phone]; Fax: [phone]          Hospitalizations and/or ED Visits  Most Recent: 12/24/18     Reason: Pyelonephritis affecting pregnancy in second trimester      Concerns regarding my health are housing, Medical bills.    Advanced Directive/Living Will: The patient was given information regarding Adanced Directives/Living Will      Mariela elected to enroll in the [facility] Care Coordination.  Mariela was given a copy of the Clinic Care Coordination brochure and full description of how to access their care team both during clinic hours and after hours.     My Care Guide's Name and Phone Number: Nena Regan [phone]    The Care Guide and myself agreed to be in contact every 2 weeks.      Medication Update  The patient's medication list is up to date per [facility]    Health Maintenance and Immunization Update  The patient's preventive health screenings and immunizations are not up to date and the following actions are recommended and will be followed up by my Care Guide Health Care Directive, HPV Vaccine.      All [facility] patients have access to a Nurse 24 hours a day, 7 days a week.  If you have questions or want advice from a Nurse, please know [facility] is here for you.  You  can call your clinic and they will connect you or you can call Care Connection at [phone].  [facility] also has Walk In Care clinics in multiple locations.  Call the number listed above for more information about our Walk In Care clinics or visit the [facility] website at [email or web address].    Patient Support  I will ask my emergency contact for help in supporting me in these goals.  Relationship to patient: Mother

## 2021-05-29 NOTE — PROGRESS NOTES
Programs Applying For: FAA  County:  Case #:  [facility] Worker:   Jj #:   PMI #:   Tracking:   Date Applied:     Outreach:   5/28/2019:  FRG called patient to discuss referral and left voicemail. FRG will make outreach call in 1 week.   Attempt X2  5/21/2019:  FRG called patient to discuss referral and left voicemail. FRG will make outreach call in 1 week.   Attempt X1      Health Insurance Information:       Referral:          Krishna Ganadra,     Please call Mariela to screen for HE FAA.     Thank you,   Nena

## 2021-05-29 NOTE — PROGRESS NOTES
Assessment/Plan:    Attention deficit disorder  There is documentation of this diagnosis.  However, the patient is high risk for stimulant use.  She is open to a trial of Strattera.  She is not breast-feeding.  This was prescribed.  She should follow-up with whomever she chooses to be her primary care provider in 1 month.    MDD (major depressive disorder)  Symptoms are stable postdelivery.  She is on a very large dose of Lexapro and there is not an opportunity to increase her dose.  We talked about augmentation of this therapy with bupropion.  The patient's discussion of mental health is a bit unclear and that she is asking for additional therapy but she also says that her current therapy is working quite well.  For now, we will keep Lexapro at the current dose which is 30 mg.  Again, I recommended that she follow-up with her primary care provider in 30 days.  I also referred her to psychiatry for discussion of suitability for ADHD medications beyond on stimulants.    Thrombocytopenia during pregnancy (H)  Continue taper per hematology.  Follow-up as recommended by hematology.    Return in about 1 month (around 7/10/2019) for ADHD - Stratterra med check.    Syed Chew MD  _______________________________    Chief Complaint   Patient presents with     Depression     Questions For Providers/results     ADHD medications-can she start them?      Subjective: Mariela Chester is a 23 y.o. year old female who returns to clinic for the following chronic complaints/concerns:     Thrombocytopenia:   -Notes reviewed from [facility].  There is reference to taper but the taper plan is not entirely apparent from the documentation.  The patient states that she has a taper that has been prescribed and she is planning on following that.  She has medications to complete this taper.  She is planning on following with with her hematologist.  There is no bleeding during the process of giving birth.    Depression:   -The  "patient says that Lexapro appears to be working.  She also has for an increased dose.  She does not identify any side effects.  She does not want to return to her prepregnancy medication which was duloxetine.  She says that her mood is been high since giving birth last week.  She feels well.     ADHD:   - not interested in adderall.  She knows that her history of methamphetamine abuse is a contraindication to being on stimulants.  She also wonders if she could be on Concerta.  She notes that she is been on Vyvanse which is effective in the past.  She does not currently have a psychiatrist.  She confirms that she has not been on doctor supervised treatment for ADHD in several years.    Review of systems is negative except for as shown in the HPI.    The following portions of the patient's history were reviewed and updated as appropriate: allergies, current medications, past medical history and problem list.    Objective:    height is 5' 7\" (1.702 m) and weight is 206 lb (93.4 kg). Her blood pressure is 128/80 and her pulse is 68.   General: No acute distress  Psych: Normal affect.  Normal rate of speech.  No tangentiality.  Denies suicidality.  Thinking is logical.  Denies hallucinations.     Reviewed PHQ9 and GAD7 as shown in the flowsheet.    No results found for this or any previous visit (from the past 24 hour(s)).    Additional History from Old Records Summarized (2): yes  Decision to Obtain Records (1): no  Radiology Tests Summarized or Ordered (1): no  Labs Reviewed or Ordered (1): yes  Medicine Test Summarized or Ordered (1): no  Independent Review of EKG or X-RAY(2 each): no      This note has been dictated using voice recognition software. Any grammatical or context distortions are unintentional and inherent to the software  "

## 2021-05-29 NOTE — PROGRESS NOTES
Programs Applying For: FAA  County:  Case #:  [facility] Worker:   Jj #:   PMI #:   Tracking:   Date Applied:     Outreach:   6/10/2019: I have attempted to call this patient 3 times. At this time patient will need to be re-referred to FRG for assistance. Patient has been given CG information for follow up. Removing patient from FRG panel.   6/7/2019: Chart notes indicate patient had baby on 6/5. FRG will move outreach to one week as she may be in hospital still ([facility])  CLOSED ENCOUNTER:  5/28/2019:  FRG called patient to discuss referral and left voicemail. FRG will make outreach call in 1 week.   Attempt X2  5/21/2019:  FRG called patient to discuss referral and left voicemail. FRG will make outreach call in 1 week.   Attempt X1      Health Insurance Information:       Referral:          Krishna Gandara,     Please call Mariela to screen for HE FAA.     Thank you,   Nena

## 2021-05-29 NOTE — PROGRESS NOTES
Scheduled F/U Call:  Attempt 1    Attempt 1: Care Guide called patient.  If this patient is returning my call please transfer to Nena Regan at ext [phone].    Email: [email or web address]  Marital Status: Single  Children: 1 son, 5/21/19- 36 weeks pregnant  Born and Raised:   Occupation:   Living Situation: Lives with mom in [place]  Smoking, Alcohol, other:  Services receiving:   CCC/HCH Enrollment: 4/30/19  CCC/HCH Follow up s: Every two weeks  Next follow up date: 6/14/19  NO shows for CCC:  [facility] #1 6/4/19

## 2021-05-29 NOTE — PROGRESS NOTES
Scheduled F/U Call:  Attempt 2   Attempt 1: Care Guide called patient.  If this patient is returning my call please transfer to Nena Regan at ext [phone].    Email: [email or web address]  Marital Status: Single  Children: 1 son, 5/21/19- 36 weeks pregnant  Born and Raised:   Occupation:   Living Situation: Lives with mom in [place]  Smoking, Alcohol, other:  Services receiving:   CCC/HCH Enrollment: 4/30/19  CCC/HCH Follow up s: Every two weeks  Next follow up date: 6/19/19  NO shows for CCC:  [facility] #1 6/4/19

## 2021-05-30 NOTE — PROGRESS NOTES
Scheduled Follow Up Call: Attempt 1   Community Health Worker called and Phone is not accepting calls at this time. If the patient is returning my call, please transfer the patient to Nena Regan at ext. [phone].  Next Outreach: 7/25/19  Last Assessment date: 4/30/19

## 2021-05-30 NOTE — PROGRESS NOTES
Scheduled Follow Up Call: Attempt 2   Community Health Worker called and left a message for the patient. If the patient is returning my call, please transfer the patient to Nena Regan at ext. [phone].     I have called the patient 2 times over the past two weeks and have been unsuccessful in reaching him/her.  The patient has not returned any of my messages. I have mailed a letter to the patient requesting a return call and will continue attempting to reach out to the patient in one month. I will also check the patient's chart for upcoming appointments, ER reports that may contain a new phone number, or any other recent activity.  Next Outreach: 8/29/19  Last Assessment date: 4/30/19

## 2021-05-30 NOTE — PROGRESS NOTES
Programs Applying For: [facility]  County:  Case #:  [facility] Worker:   Dignaure #:   PMI #:   Tracking:   Date Applied:     Outreach:   2019: I have attempted to call this patient 3 times. At this time patient will need to be re-referred to [facility] for assistance. Patient has been given CG information for follow up. Removing patient from FRG panel.   7/15/2019: FRG called patient, number is not in service. FRG will check schedule/call patient in 1 week.   2019: FRG called patient, number is not in service. FRG will check schedule/call patient in 1 week.   CLOSED ENCOUNTER:  2019: FRG called patient to screen/schedule appointment. Patient would like to meet with FRG after an OB appointment. Patient will email FRG once she makes an OB appointment so FRG can meet patient after. FRG will make outreach call in 2 weeks if an OB appointment isn't made/or email sent to FRG.      Health Insurance Information:       Referral:   Krishna Gandara,     Could you call Mariela again? She is still interested in working to apply for [facility].     Thank you!   [facility] Application Screenin. How many people in household? 3 (lives with mother and step father)  2. Do you file taxes, who do you file with (need 1040)? Patient doesn't know if parents file her as a dependent, she is assuming no.  3. What is your monthly income (need 2 recent paystubs)? 0.00  4. Do you have a bank account, need recent statement? Patient does not have a bank account

## 2021-05-31 NOTE — PROGRESS NOTES
"This is patients \"Scheduled Follow Up Call: Attempt 3\" and patient should be unenrolled at this time. Patient is \"Active\" with MyChart. CHW will send MyChart message to patient and if I don't here back from patient in 1 week she will be unenrolled from Clinic Care Coordination.    Next Outreach: 9/6/19  Last Assessment date: 4/30/19  "

## 2021-06-01 NOTE — PROGRESS NOTES
Scheduled Follow Up Call: Attempt 3   Community Health Worker called and was unable to leave a message for the patient. If the patient is returning my call, please transfer the patient to Nena Regan at ext. [phone].     I have called the patient 3 times over the past six weeks, mailed an unenrollment letter today and have been unsuccessful in reaching him/her. Patient has been unenrolled from Clinic Care coordination services, should they return my call or answer my letter, I will discuss clinic care coordination re-enrollment.

## 2021-06-02 VITALS — BODY MASS INDEX: 31.56 KG/M2 | WEIGHT: 201.5 LBS

## 2021-06-02 VITALS — WEIGHT: 201.5 LBS | BODY MASS INDEX: 31.56 KG/M2

## 2021-06-02 VITALS — HEIGHT: 67 IN | BODY MASS INDEX: 31.08 KG/M2 | WEIGHT: 198 LBS

## 2021-06-02 VITALS — WEIGHT: 204.19 LBS | BODY MASS INDEX: 31.98 KG/M2

## 2021-06-03 VITALS — BODY MASS INDEX: 33.2 KG/M2 | WEIGHT: 212 LBS

## 2021-06-03 VITALS — WEIGHT: 215.31 LBS | BODY MASS INDEX: 33.72 KG/M2

## 2021-06-03 VITALS — BODY MASS INDEX: 34.57 KG/M2 | WEIGHT: 220.7 LBS

## 2021-06-03 VITALS — WEIGHT: 211 LBS | BODY MASS INDEX: 33.05 KG/M2

## 2021-06-03 VITALS — HEIGHT: 67 IN | WEIGHT: 206 LBS | BODY MASS INDEX: 32.33 KG/M2

## 2021-06-03 VITALS — WEIGHT: 216.44 LBS | BODY MASS INDEX: 33.9 KG/M2

## 2021-06-19 NOTE — LETTER
Letter by Nena Regan CHW at      Author: Nena Regan CHW Service: -- Author Type: --    Filed:  Encounter Date: 9/17/2019 Status: (Other)           Dear Mariela,                                                                         On 4/30/19, you enrolled with the [facility]'s Clinic Care Coordination services.  In order for the Clinic Care Coordination team to provide guidance in completing the goals and action steps you have established, you and I agreed we would be in contact every Month.  We last spoke on 6/19/19 in order to follow up on goals and action steps.     I have been trying to reach you since 7/15/19 and have been unsuccessful.  At this time, you have been disenrolled from Clinic Care Coordination and you will no longer receive follow up calls from the Clinic Care Coordination team. If you would like access to services in the future, please discuss your needs with your Primary Care Provider at your next office visit.                                                                                                        Sincerely,                                                                         CRISTIN Arias  Clinic Care Coordination   [phone]  [email or web address]

## 2021-06-19 NOTE — LETTER
Letter by Nena Regan CHW at      Author: Nena Regan CHW Service: -- Author Type: --    Filed:  Encounter Date: 7/30/2019 Status: (Other)         Dear Mariela,                                                                            On 4/30/19 you enrolled with the [facility]'s Clinic Care  Coordination Services.  In order for the Clinic Care  Coordination team to provide guidance in completing the goals and actions steps you have established, you and I agreed we would be in contact every Month.                                  We last spoke on 6/19/19 in order to follow up on goals and action steps.  Since then, I have tried calling you 2 times in the last two weeks and have been unsuccessful in reaching you.                Please call me at [phone] at your earliest convenience.  If you reach my voicemail, please leave a message with your daytime telephone number and a date and time that I can return your call.                                                                            Sincerely,                                                                         CRISTIN Arias  Clinic Care Coordination   [phone]  [email or web address]

## 2021-06-24 NOTE — PROGRESS NOTES
"Clinic Note - Return OB Visit    Mariela Chester is a 23 y.o.  female who presents to clinic for a follow up OB visit. She is currently 26w1d with an estimated date of delivery of 19 via LMP c/w 13 wk US.     She denies headache, chest pain, shortness of breath, abdominal pain/contractions, vaginal bleeding, or abnormal discharge. She has felt baby move.     Pt is transferring care at this time - had initially been seen at [facility].    New concerns today:   -meth use: sober 20+ days, relapsed for a few days but had been sober for 90 days before that, used during first trimester, outpatient program in [place] 3x/week (long program 36 weeks or so; this is alternative to long-term time for procession and drug paraphernalia); doing drug tests twice/week  -tobacco use: still smoking, 1 ppd, helps manage stress  -CPS involved, drug task force came and searched her house  -depression: started lexapro 10mg, feels like this is working, no major side effects  -hx pyelonephritis: no current issues, hx recurrent UTIs    Per note by Dr Chew on 3/4/19:  \"In reviewing records through care everywhere, she is probably in her third trimester with an estimated date of delivery of 2019.  She has had scant prenatal care, most recently .  At that time, there was concerns for kidney infection.  There have been concerns that this patient is on amphetamines during pregnancy with the most recent confirmatory test on  being positive.  In reviewing the record, but it appears that she was reported for her positive amphetamine/methamphetamine check to see [facility].  There is documentation dated on  that the report was made and that the patient was unaware of this report.  In reviewing the initial OB, there is discussion of a history of anxiety and depression.  There is also discussion of a history of alcohol and drug abuse.  Early in the pregnancy she was using methamphetamines.  [facility] " "\[facility].\"  They have a coordinator to help women who are pregnant.  In the past, she is been on Cymbalta.  -.  Poor energy, like last pregnancy.  Son is ~3 months.   - \"depressed.\"  Long standing.  STarted 13 years ago when her 39 year old dad  of a MI.    - no antidepressants during this pregnancy.  \"No, but I tried Effexor.    - currently in treatment: 3x/week.  [facility] in [place]   - kidney problems resolved\"    OB History      Para Term  AB Living    2 1 1     1    SAB TAB Ectopic Multiple Live Births            1        Past Medical History:   Diagnosis Date     History of suicide attempt 3/9/2018     History of syphilis 3/8/2018    Overview:  2015.  Treated.      History reviewed. No pertinent surgical history.    Social History     Socioeconomic History     Marital status: Single     Spouse name: Not on file     Number of children: Not on file     Years of education: Not on file     Highest education level: Not on file   Occupational History     Not on file   Social Needs     Financial resource strain: Not on file     Food insecurity:     Worry: Not on file     Inability: Not on file     Transportation needs:     Medical: Not on file     Non-medical: Not on file   Tobacco Use     Smoking status: Current Every Day Smoker     Smokeless tobacco: Never Used   Substance and Sexual Activity     Alcohol use: No     Frequency: Never     Drug use: No     Sexual activity: Yes     Partners: Male   Lifestyle     Physical activity:     Days per week: Not on file     Minutes per session: Not on file     Stress: Not on file   Relationships     Social connections:     Talks on phone: Not on file     Gets together: Not on file     Attends [employer]ism service: Not on file     Active member of club or organization: Not on file     Attends meetings of clubs or organizations: Not on file     Relationship status: Not on file     Intimate partner violence:     Fear of current or ex " partner: Not on file     Emotionally abused: Not on file     Physically abused: Not on file     Forced sexual activity: Not on file   Other Topics Concern     Not on file   Social History Narrative     Not on file     Current Outpatient Medications on File Prior to Visit   Medication Sig Dispense Refill     escitalopram oxalate (LEXAPRO) 5 MG tablet Take 1 tablet (5 mg total) by mouth daily. 30 tablet 1     hydrOXYzine HCl (ATARAX) 25 MG tablet Take 1-3 tablets (25-75 mg total) by mouth 3 (three) times a day as needed for anxiety. 90 tablet 0     NON FORMULARY Pt is taking prenatal vitamins       No current facility-administered medications on file prior to visit.      No Known Allergies    Family History   Problem Relation Age of Onset     Early death Father 39     Heart disease Father      Thyroid disease Mother      Heart failure Maternal Grandmother      Heart attack Maternal Grandfather      No Medical Problems Paternal Grandmother      No Medical Problems Paternal Grandfather        Physical exam:  /60   Pulse 87   Wt 201 lb 8 oz (91.4 kg)   BMI 31.56 kg/m      General: alert, female in no acute distress  Abdomen: gravid uterus appropriate for gestation age at 26 cm above pubic symphysis, non-tender, FHTs 145  Extremities: no edema    Assessment/Plan:  1. Encounter to establish care with new doctor  2. Supervision of high risk pregnancy in third trimester  3. Methamphetamine abuse in remission (H)  Pt transferring care to myself at 26+3 wks gestation; . Multiple high risk issues affecting this pregnancy including intermittent meth use (during most of first trimester and then again in 2nd trimester), tobacco use, pyelonephritis during pregnancy. Pt is currently participating in intensive outpatient treatment for her meth use. Pt states through the program she is having drug testing done twice per week, discussed we will check one today as well given it is our first meeting, results negative as  pt reported it would be. CPS has already been contacted this pregnancy, will contact them again upon delivery or sooner if new concerns arise. Will plan for UDS at hospital and possibly intermittently in clinic. Will plan to deliver at [facility] given capabilities for  care and monitoring, mom agrees with this plan.  - Drug Abuse 1+, Urine    4. Pyelonephritis affecting pregnancy in second trimester  Pt with a hx of pyelonephritis in Dec 2018, which was treated and pt declines further symptoms. Pt was not started on prophylactic antibiotic therapy at that time and I see no reason to do so now; however, if pt develops one more UTI during this pregnancy I would plan for ppx antibiotics until delivery.    5. Tobacco smoking affecting pregnancy in third trimester  Pt is currently smoking 1 ppd, she states she is unable to quit right now given significant stress and working on sobriety from meth. Discussed potential risks to baby/pregnancy and mom voices understanding.     6. Moderate episode of recurrent major depressive disorder (H)  Pt is currently taking lexapro 10mg daily, started approx 1 week ago. She feels that she may be starting to notice improvements already. Will continue this dose for now and continue to monitor mood symptoms, may need increased dose in the future.    7. Thrombocytopenia during pregnancy (H)  Pt shares that she had low platelets earlier in her pregnancy. Denies issues with easy bruising or bleeding. Per review of labs in [facility] Everywhere here are prior platelet levels:  18 102  12/10/18 67  18 103  18 100  18 83  Will plan to recheck CBC in 2 weeks when we also do 1 hr GCT and 2nd trimester syphilis screen.      Fetal survey ultrasound completed and normal  Discussed pre-term labor signs and symptoms and when to call/come in.   Continued encouragement of breastfeeding -patient states she plans to formula feed  Discussed expectations for  gestational diabetes screen to be completed at next visit.    Follow up in 4 weeks for routine prenatal visit.

## 2021-06-24 NOTE — PROGRESS NOTES
Assessment/Plan:    Attention deficit disorder  Historic diagnosis.  Patient has a history of methamphetamine abuse.  She also has been on stimulants in the past after cursory review of the electronic medical record.  We discussed that treatment of ADHD with stimulants while pregnant and is not recommended.  We should however focus on improving her mental health as discussed elsewhere.    Methamphetamine abuse in remission (H)  Patient is currently participating in a 3 day/week outpatient program in the [facility].  She says that she has had one setback in her desire to maintain sobriety at the length of her pregnancy.  She did test positive and this was reported to the [facility] authorities.  The patient says that she is actually had several visits with the court and part of her motivation for coming to clinic today is to establish care with a obstetrics provider who she trusts.  For now, continue outpatient therapy.    Normal pregnancy   at approximately 25+4 based on LMP with a late first semester ultrasound (not available for review).  Pregnancy complicated by intermittent methamphetamine use.  Authorities are aware.  She would like to deliver at [facility] under the care of a family physician.  - We will attempt to have the patient to establish care with 1 of the obstetric providers at the [facility] family medicine clinic.  I will reach out to them prior to the transfer of care clinic visit to see if they believe this patient is appropriate for family medicine.  If not, I would refer to 1 of the local obstetrics groups.  Given the potential for substance abuse, this child should be born at [facility] given the  capabilities of the facility.    Pyelonephritis affecting pregnancy in second trimester  Symptoms resolved.    Tobacco smoking complicating pregnancy  Smoking cessation discussed.    MDD (major depressive disorder)  This patient has a long history of anxiety and  depression.  She is currently not on any therapy as she struggles to maintain sobriety during a time of stress and social disorder.  She is interested on being in being on some type of medicine.  She is been on venlafaxine, duloxetine, bupropion, sertraline in the past.  Duloxetine is been the most effective medication.  Given comorbid anxiety, we will proceed with a trial of a low-dose of escitalopram.  The patient will need to establish care with a provider.  If she ends up seeing an obstetrician to manage her pregnancy, I would like to see the patient back to clinic in 3-4 weeks for a follow-up visit.    Greater than 45 minutes of total time was spent with patient of which greater than 50% was spent on counseling and coordination of care.    Return in about 1 week (around 3/11/2019) for OB Transfer of care.    Syed Chew MD  _______________________________    Chief Complaint   Patient presents with     Medication Education Visit     pt would like to start medication for ADHD and depression      Subjective: Mariela Chester is a 23 y.o. year old female who returns to clinic for the following chronic complaints/concerns:     Pregnancy/depression/ADHD:   -This patient is new to clinic today.  In reviewing records through care everywhere, she is probably in her third trimester with an estimated date of delivery of June 2019.  She has had scant prenatal care, most recently 2/19.  At that time, there was concerns for kidney infection.  There have been concerns that this patient is on amphetamines during pregnancy with the most recent confirmatory test on 2/19 being positive.  In reviewing the record, but it appears that she was reported for her positive amphetamine/methamphetamine check to see [facility].  There is documentation dated on 2/20 that the report was made and that the patient was unaware of this report.  In reviewing the initial OB, there is discussion of a history of anxiety and depression.   "There is also discussion of a history of alcohol and drug abuse.  Early in the pregnancy she was using methamphetamines.  [facility]  They have a coordinator to help women who are pregnant.  In the past, she is been on Cymbalta.  -.  Poor energy, like last pregnancy.  Son is ~3 months.   - \"depressed.\"  Long standing.  STarted 13 years ago when her 39 year old dad  of a MI.    - no antidepressants during this pregnancy.  \"No, but I tried Effexor.    - currently in treatment: 3x/week.  [facility] in [place]   - kidney problems resolved.    Review of systems is negative except for as shown in the HPI.    The following portions of the patient's history were reviewed and updated as appropriate: allergies, current medications, past family history, past medical history, past social history, past surgical history and problem list.    Objective:    height is 5' 7\" (1.702 m) and weight is 198 lb (89.8 kg). Her oral temperature is 98.1  F (36.7  C). Her blood pressure is 126/74 and her pulse is 100. Her respiration is 18 and oxygen saturation is 98%.   General: No acute distress, pleasant.  Eyes: No conjunctival injection, muscular icterus.  ENT: No submandibular anterior cervical lymphadenopathy.  Cardiac: Regular rate and rhythm, normal S1/S2, no murmurs rubs or gallops  Respiratory: Clear to auscultation bilaterally.  Abdomen: Soft, nontender.  Gravid abdomen.    Neurologic: Cranial 2 through 12 grossly intact.  5/5 strength in upper and lower extremities.  Psych: Affect is somewhat blunted.  Normal rate of speech.  Thinking is concrete.  Denies suicidality.    Reviewed the PHQ9 in [facility] 7 is shown in the flowsheet.    Reviewed multiple notes through the previous health system.  She had confirmed methamphetamine in her urine in February.  There apparently was an ultrasound done back in December which is referenced and is the determination for her due date.  I cannot find the " actual report of this ultrasound.  Weight at that time was 190 pounds.  Notes describe antidepression medications including Cymbalta.  The patient previously said that her LMP was 9/5/2018.  Her date of a positive pregnancy test was 10/2/2018.  At her initial OB her urine drug screen was negative for illegal substances.    Fetal Heart tones today were 150 bpm.    No results found for this or any previous visit (from the past 24 hour(s)).    Additional History from Old Records Summarized (2): yes  Decision to Obtain Records (1): yes  Radiology Tests Summarized or Ordered (1): yes  Labs Reviewed or Ordered (1): yes  Medicine Test Summarized or Ordered (1): no  Independent Review of EKG or X-RAY(2 each): no    This note has been dictated using voice recognition software. Any grammatical or context distortions are unintentional and inherent to the software

## 2021-06-24 NOTE — PATIENT INSTRUCTIONS - HE
Phone Numbers:  [facility]: [phone]    Keep taking lexapro 2 tablets (10mg) daily  If having some cramps, drink water and rest and see if improves  Follow up in 2 weeks: diabetes test, check platelets     When to call or come in to the hospital  If you notice a decrease in your baby's movement.   If your begin to experience contractions that are 5 minutes or less apart and lasting for over 45 seconds, or if contractions are becoming more painful.  If you have any bleeding or leakage of fluids.   If you have a headache not resolved with tylenol, right upper abdominal pain, or sudden onset of swelling.  You know your body best. Never hesitate to call or go to the hospital if something doesn't feel right!    Gestational Diabetes Screen  At your next visit, you will be screened for gestational diabetes. You will drink a sugary drink and then have your blood drawn to see how your body responds to a sugar load. This test takes about an hour to complete - please plan your schedule accordingly!    The Benefits of Breastfeeding   Breastfeeding gives your new baby the very best start. It supplies nutrition, comfort, and love. Experts agree: Breastfeeding is the healthiest choice for babies during the first year of life and beyond. It s healthy for Mom, too. Breastfeeding may be challenging at first. But with support, you and your baby can succeed together.      Healthiest for Baby   Breastmilk is the ideal food for babies. It has all the nutrients your little one needs to grow healthy and strong. Here are some of the many benefits for your baby:   Breastfeeding provides contact that babies love. Frequent skin-to-skin time with Mom is calming and comforting.   Breastmilk is full of antibodies. These are substances that help your baby fight infection. Breastmilk reduces your baby's risk of respiratory illnesses, ear infections, and diarrhea.   Breastfeeding reduces your baby s risk of allergies, colds, and many other  diseases.   Breastmilk changes as your baby grows, meeting her changing needs.   Breastmilk is easy for your baby to digest.   Breastmilk contains DHA, a fat that is good for your baby s developing brain and eyes.   Breastmilk decreases the risk of sudden infant death syndrome (SIDS).    Healthiest for Mom   For many women, breastfeeding is an amazing experience. It creates a strong bond between mother and baby. Other benefits for Mom include:   You can feel proud knowing that your baby is growing healthy and strong because of your milk.   Breastmilk is convenient. It s free, clean, and always the right temperature.   Breastfeeding burns calories. This can help you lose pregnancy weight faster.   Breastfeeding releases hormones that contract the uterus. This helps the uterus return to its normal size after childbirth.   Mothers who breastfeed have a decreased risk of ovarian and breast cancers.

## 2021-07-14 PROBLEM — O23.02 PYELONEPHRITIS AFFECTING PREGNANCY IN SECOND TRIMESTER: Status: RESOLVED | Noted: 2018-12-25 | Resolved: 2019-06-13

## 2021-07-16 NOTE — TELEPHONE ENCOUNTER
Md here and spoke to ptGalo   Reason contacted:  Test results within this encounter.   Information relayed:  Dr Chery notes below.   Additional questions:  No  Further follow-up needed:  Yes pt will come today for a penicillin shot, she is place in the nurse schedule   Okay to leave a detailed message:  No   Magi Almaraz

## 2021-09-19 ENCOUNTER — HEALTH MAINTENANCE LETTER (OUTPATIENT)
Age: 26
End: 2021-09-19

## 2021-12-28 ENCOUNTER — HOSPITAL ENCOUNTER (EMERGENCY)
Facility: CLINIC | Age: 26
Discharge: HOME | End: 2021-12-28
Attending: EMERGENCY MEDICINE | Admitting: EMERGENCY MEDICINE
Payer: COMMERCIAL

## 2021-12-28 VITALS
WEIGHT: 216 LBS | DIASTOLIC BLOOD PRESSURE: 88 MMHG | BODY MASS INDEX: 33.9 KG/M2 | HEIGHT: 67 IN | SYSTOLIC BLOOD PRESSURE: 141 MMHG | OXYGEN SATURATION: 98 % | HEART RATE: 102 BPM | TEMPERATURE: 98.9 F | RESPIRATION RATE: 20 BRPM

## 2021-12-28 DIAGNOSIS — N30.01 ACUTE CYSTITIS WITH HEMATURIA: ICD-10-CM

## 2021-12-28 LAB
ALBUMIN UR-MCNC: 70 MG/DL
APPEARANCE UR: ABNORMAL
BILIRUB UR QL STRIP: ABNORMAL
COLOR UR AUTO: ABNORMAL
GLUCOSE UR STRIP-MCNC: NEGATIVE MG/DL
HCG UR QL: NEGATIVE
HGB UR QL STRIP: ABNORMAL
KETONES UR STRIP-MCNC: NEGATIVE MG/DL
LEUKOCYTE ESTERASE UR QL STRIP: ABNORMAL
MUCOUS THREADS #/AREA URNS LPF: PRESENT /LPF
NITRATE UR QL: POSITIVE
PH UR STRIP: 5.5 [PH] (ref 5–7)
RBC URINE: 5 /HPF
SP GR UR STRIP: 1.04 (ref 1–1.03)
SQUAMOUS EPITHELIAL: 24 /HPF
TRANSITIONAL EPI: 1 /HPF
UROBILINOGEN UR STRIP-MCNC: 3 MG/DL
WBC URINE: 28 /HPF

## 2021-12-28 PROCEDURE — 81001 URINALYSIS AUTO W/SCOPE: CPT | Performed by: EMERGENCY MEDICINE

## 2021-12-28 PROCEDURE — 87086 URINE CULTURE/COLONY COUNT: CPT | Performed by: EMERGENCY MEDICINE

## 2021-12-28 PROCEDURE — 81025 URINE PREGNANCY TEST: CPT | Performed by: EMERGENCY MEDICINE

## 2021-12-28 PROCEDURE — 99283 EMERGENCY DEPT VISIT LOW MDM: CPT

## 2021-12-28 RX ORDER — SULFAMETHOXAZOLE/TRIMETHOPRIM 800-160 MG
1 TABLET ORAL 2 TIMES DAILY
Qty: 14 TABLET | Refills: 0 | Status: SHIPPED | OUTPATIENT
Start: 2021-12-28 | End: 2022-01-04

## 2021-12-28 RX ORDER — PHENAZOPYRIDINE HYDROCHLORIDE 200 MG/1
200 TABLET, FILM COATED ORAL 3 TIMES DAILY
Qty: 9 TABLET | Refills: 0 | Status: SHIPPED | OUTPATIENT
Start: 2021-12-28 | End: 2021-12-31

## 2021-12-28 ASSESSMENT — MIFFLIN-ST. JEOR: SCORE: 1752.4

## 2021-12-28 NOTE — ED TRIAGE NOTES
48 hour history of hematuria, urgency and burning with urination.  Seen at clinic and told she did not have a UTI.  C/O lower abdominal discomfort.

## 2021-12-29 NOTE — ED PROVIDER NOTES
EMERGENCY DEPARTMENT ENCOUNTER      NAME: Mariela Chester  AGE: 26 year old female  YOB: 1995  MRN: [id number]  EVALUATION DATE & TIME: No admission date for patient encounter.    PCP: No primary care provider on file.    ED PROVIDER: Norrsi Bryson M.D.    Chief Complaint   Patient presents with     Hematuria       FINAL IMPRESSION:  1. Acute cystitis with hematuria        ED COURSE & MEDICAL DECISION MAKING:    Pertinent Labs & Imaging studies personally reviewed and interpreted by me. (See chart for details)  6:25 PM Patient seen and examined, prior records reviewed.  Patient presents with urinary frequency yesterday, hematuria today.  She has no flank pain to suggest pyelonephritis or kidney stone, no fever, no nausea, vomiting, or diarrhea.  She has suprapubic tenderness.  Urinalysis is contaminated but consistent with infection, no prior cultures available, Bactrim and Pyridium are prescribed.  I discussed the plan for discharge with the patient, and patient is agreeable. We discussed supportive cares at home and reasons for return to the ER including new or worsening symptoms - all questions and concerns addressed. Patient to be discharged by RN.        At the conclusion of the encounter I discussed the results of all of the tests and the disposition. The questions were answered. The patient or family acknowledged understanding and was agreeable with the care plan.     PROCEDURES:   Procedures    MEDICATIONS GIVEN IN THE EMERGENCY:  Medications - No data to display    NEW PRESCRIPTIONS STARTED AT TODAY'S ER VISIT  New Prescriptions    PHENAZOPYRIDINE (PYRIDIUM) 200 MG TABLET    Take 1 tablet (200 mg) by mouth 3 times daily for 3 days    SULFAMETHOXAZOLE-TRIMETHOPRIM (BACTRIM DS) 800-160 MG TABLET    Take 1 tablet by mouth 2 times daily for 7 days       =================================================================    HPI    Patient information was obtained from: patient        Mariela Chester is a 26 year old female who presents with hematuria and urinary frequency.  She started having frequency and burning yesterday, hematuria today.  She was seen in outside facility earlier and told that her urine was negative for infection.  She denies fever, chills, nausea, vomiting, diarrhea.  No back pain.  No history of kidney stones.    REVIEW OF SYSTEMS   Review of Systems   All other systems reviewed and negative    PAST MEDICAL HISTORY:  Past Medical History:   Diagnosis Date     Anxiety      Depression        PAST SURGICAL HISTORY:  Past Surgical History:   Procedure Laterality Date     MOUTH SURGERY      wisdom teeth       CURRENT MEDICATIONS:    No current facility-administered medications for this encounter.     Current Outpatient Medications   Medication     phenazopyridine (PYRIDIUM) 200 MG tablet     sulfamethoxazole-trimethoprim (BACTRIM DS) 800-160 MG tablet     HYDROcodone-acetaminophen (NORCO) 5-325 MG per tablet     ibuprofen (ADVIL,MOTRIN) 600 MG tablet     Prenatal Vit-Fe Fumarate-FA (PRENATAL MULTIVITAMIN  PLUS IRON) 27-0.8 MG TABS     senna-docusate (SENOKOT-S;PERICOLACE) 8.6-50 MG per tablet       ALLERGIES:  Allergies   Allergen Reactions     Nuts      Other reaction(s): Throat Irritation       FAMILY HISTORY:  Family History   Problem Relation Age of Onset     Thyroid Disease Mother      Alcohol/Drug Father         alcohol     Alcohol/Drug Maternal Grandmother         alcohol     Cardiovascular Father         MI     Cardiovascular Maternal Grandfather         MI     Bipolar Disorder Paternal Grandmother      Early Death Father 39.00     Heart Disease Father      Heart Failure Maternal Grandmother      Coronary Artery Disease Maternal Grandfather      No Known Problems Paternal Grandmother      No Known Problems Paternal Grandfather        SOCIAL HISTORY:   Social History     Socioeconomic History     Marital status: Single     Spouse name: Not on file     Number of  "children: Not on file     Years of education: Not on file     Highest education level: Not on file   Occupational History     Not on file   Tobacco Use     Smoking status: Current Every Day Smoker     Packs/day: 0.50     Years: 4.00     Pack years: 2.00     Types: Cigarettes     Smokeless tobacco: Never Used     Tobacco comment: smoking 3cig/day with pregnancy   Substance and Sexual Activity     Alcohol use: No     Drug use: No     Sexual activity: Yes     Partners: Male   Other Topics Concern     Not on file   Social History Narrative     Not on file     Social Determinants of Health     Financial Resource Strain: Not on file   Food Insecurity: Not on file   Transportation Needs: Not on file   Physical Activity: Not on file   Stress: Not on file   Social Connections: Not on file   Intimate Partner Violence: Not on file   Housing Stability: Not on file       VITALS:  BP (!) 141/88   Pulse 102   Temp 98.9  F (37.2  C) (Oral)   Resp 20   Ht 1.702 m (5' 7\")   Wt 98 kg (216 lb)   LMP 12/18/2021   SpO2 98%   BMI 33.83 kg/m      PHYSICAL EXAM:  Physical Exam  Vitals and nursing note reviewed.   Constitutional:       Appearance: Normal appearance.   HENT:      Head: Normocephalic and atraumatic.      Right Ear: External ear normal.      Left Ear: External ear normal.      Nose: Nose normal.      Mouth/Throat:      Mouth: Mucous membranes are moist.   Eyes:      Extraocular Movements: Extraocular movements intact.      Conjunctiva/sclera: Conjunctivae normal.      Pupils: Pupils are equal, round, and reactive to light.   Cardiovascular:      Rate and Rhythm: Normal rate and regular rhythm.   Pulmonary:      Effort: Pulmonary effort is normal.      Breath sounds: Normal breath sounds. No wheezing or rales.   Abdominal:      General: Abdomen is flat. There is no distension.      Palpations: Abdomen is soft.      Tenderness: There is abdominal tenderness (suprapubic). There is no guarding.      Comments: No CVA " tenderness   Musculoskeletal:         General: Normal range of motion.      Cervical back: Normal range of motion and neck supple.      Right lower leg: No edema.      Left lower leg: No edema.   Lymphadenopathy:      Cervical: No cervical adenopathy.   Skin:     General: Skin is warm and dry.   Neurological:      General: No focal deficit present.      Mental Status: She is alert and oriented to person, place, and time. Mental status is at baseline.      Comments: No gross focal neurologic deficits   Psychiatric:         Mood and Affect: Mood normal.         Behavior: Behavior normal.         Thought Content: Thought content normal.          LAB:  All pertinent labs reviewed and interpreted.  Results for orders placed or performed during the hospital encounter of 12/28/21   UA with Microscopic reflex to Culture    Specimen: Urine, Clean Catch   Result Value Ref Range    Color Urine Dark Orange (A) Colorless, Straw, Light Yellow, Yellow    Appearance Urine Turbid (A) Clear    Glucose Urine Negative Negative mg/dL    Bilirubin Urine 1.0 mg/dL (A) Negative    Ketones Urine Negative Negative mg/dL    Specific Gravity Urine 1.040 (H) 1.001 - 1.030    Blood Urine 0.2 mg/dL (A) Negative    pH Urine 5.5 5.0 - 7.0    Protein Albumin Urine 70  (A) Negative mg/dL    Urobilinogen Urine 3.0 (A) <2.0 mg/dL    Nitrite Urine Positive (A) Negative    Leukocyte Esterase Urine 75 Selam/uL (A) Negative    Mucus Urine Present (A) None Seen /LPF    RBC Urine 5 (H) <=2 /HPF    WBC Urine 28 (H) <=5 /HPF    Squamous Epithelials Urine 24 (H) <=1 /HPF    Transitional Epithelials Urine 1 <=1 /HPF   HCG qualitative urine   Result Value Ref Range    hCG Urine Qualitative Negative Negative       RADIOLOGY:  Reviewed all pertinent imaging. Please see official radiology report.  No orders to display       Nai LOYA, am serving as a scribe to document services personally performed by Dr. Bryson based on my observation and the provider's  statements to me. I, Norris Bryson MD attest that Nai Chuy is acting in a scribe capacity, has observed my performance of the services and has documented them in accordance with my direction.    Norris Bryson M.D.  Emergency Medicine  [facility] EMERGENCY ROOM  [address] [address]  [phone]  Dept: [phone]       Norris Bryson MD  12/28/21 1428

## 2021-12-30 LAB — BACTERIA UR CULT: NORMAL

## 2022-01-09 ENCOUNTER — HEALTH MAINTENANCE LETTER (OUTPATIENT)
Age: 27
End: 2022-01-09

## 2022-09-01 ENCOUNTER — TELEPHONE (OUTPATIENT)
Dept: BEHAVIORAL HEALTH | Facility: CLINIC | Age: 27
End: 2022-09-01

## 2022-09-01 NOTE — TELEPHONE ENCOUNTER
[facility] [place] Follow-up  Behavioral Health Clinician Triage Service    [facility]:  [facility] Follow-up to PHQ 8/25/2022   PHQ-9 9. Suicide Ideation past 2 weeks Several days   Thoughts of suicide or self harm in past 2 weeks No   Thoughts of suicide or self harm in past 2 weeks No   PHQ-9 Safety concerns? No   PHQ-9 Safety concerns? No        1st Outreach Date: September 1, 2022 Time: 8:27am  Outcome: Left a message for patient to call [facility].  If patient doesn't return the call the [facility] Pool will make one more phone attempt within 24 hours.    Shana FOREMAN [facility]  2nd Outreach Date: September 2, 2022 Time: 12:04pm  Outcome: Left a message.  See disposition below. Sent [employer] message with resources attached. Notified PCP who she was planning on seeing of situation.   Shana FOREMAN [facility]    Shana FOREMAN, [facility]  Behavioral Health Clinician   [facility]   9/2/2022

## 2022-11-21 ENCOUNTER — HEALTH MAINTENANCE LETTER (OUTPATIENT)
Age: 27
End: 2022-11-21

## 2023-04-16 ENCOUNTER — HEALTH MAINTENANCE LETTER (OUTPATIENT)
Age: 28
End: 2023-04-16

## 2024-06-22 ENCOUNTER — HEALTH MAINTENANCE LETTER (OUTPATIENT)
Age: 29
End: 2024-06-22